# Patient Record
Sex: FEMALE | Race: WHITE | Employment: OTHER | URBAN - METROPOLITAN AREA
[De-identification: names, ages, dates, MRNs, and addresses within clinical notes are randomized per-mention and may not be internally consistent; named-entity substitution may affect disease eponyms.]

---

## 2021-11-08 ENCOUNTER — HOSPITAL ENCOUNTER (INPATIENT)
Age: 71
LOS: 4 days | Discharge: HOME | DRG: 871 | End: 2021-11-12
Attending: STUDENT IN AN ORGANIZED HEALTH CARE EDUCATION/TRAINING PROGRAM | Admitting: INTERNAL MEDICINE
Payer: MEDICARE

## 2021-11-08 ENCOUNTER — APPOINTMENT (OUTPATIENT)
Dept: CT IMAGING | Age: 71
DRG: 871 | End: 2021-11-08
Payer: MEDICARE

## 2021-11-08 DIAGNOSIS — N39.0 URINARY TRACT INFECTION WITHOUT HEMATURIA, SITE UNSPECIFIED: ICD-10-CM

## 2021-11-08 DIAGNOSIS — G93.40 ENCEPHALOPATHY: Primary | ICD-10-CM

## 2021-11-08 DIAGNOSIS — C83.33 DIFFUSE LARGE B-CELL LYMPHOMA OF INTRA-ABDOMINAL LYMPH NODES (HCC): ICD-10-CM

## 2021-11-08 PROBLEM — G93.41 ACUTE METABOLIC ENCEPHALOPATHY: Status: ACTIVE | Noted: 2021-11-08

## 2021-11-08 LAB
A/G RATIO: 1.3 (ref 1.1–2.2)
ALBUMIN SERPL-MCNC: 3.5 G/DL (ref 3.4–5)
ALP BLD-CCNC: 88 U/L (ref 40–129)
ALT SERPL-CCNC: 14 U/L (ref 10–40)
ANION GAP SERPL CALCULATED.3IONS-SCNC: 14 MMOL/L (ref 3–16)
ANISOCYTOSIS: ABNORMAL
AST SERPL-CCNC: 13 U/L (ref 15–37)
BACTERIA: ABNORMAL /HPF
BANDED NEUTROPHILS RELATIVE PERCENT: 4 % (ref 0–7)
BASOPHILS ABSOLUTE: 0 K/UL (ref 0–0.2)
BASOPHILS RELATIVE PERCENT: 0 %
BILIRUB SERPL-MCNC: 0.3 MG/DL (ref 0–1)
BILIRUBIN URINE: NEGATIVE
BLOOD, URINE: ABNORMAL
BUN BLDV-MCNC: 13 MG/DL (ref 7–20)
CALCIUM SERPL-MCNC: 9.1 MG/DL (ref 8.3–10.6)
CHLORIDE BLD-SCNC: 100 MMOL/L (ref 99–110)
CLARITY: ABNORMAL
CO2: 24 MMOL/L (ref 21–32)
COLOR: YELLOW
CREAT SERPL-MCNC: 0.7 MG/DL (ref 0.6–1.2)
EKG ATRIAL RATE: 123 BPM
EKG DIAGNOSIS: NORMAL
EKG P AXIS: 57 DEGREES
EKG P-R INTERVAL: 144 MS
EKG Q-T INTERVAL: 308 MS
EKG QRS DURATION: 58 MS
EKG QTC CALCULATION (BAZETT): 440 MS
EKG R AXIS: 30 DEGREES
EKG T AXIS: 66 DEGREES
EKG VENTRICULAR RATE: 123 BPM
EOSINOPHILS ABSOLUTE: 0 K/UL (ref 0–0.6)
EOSINOPHILS RELATIVE PERCENT: 0 %
GFR AFRICAN AMERICAN: >60
GFR NON-AFRICAN AMERICAN: >60
GLUCOSE BLD-MCNC: 88 MG/DL (ref 70–99)
GLUCOSE BLD-MCNC: 92 MG/DL (ref 70–99)
GLUCOSE URINE: NEGATIVE MG/DL
HCT VFR BLD CALC: 33.4 % (ref 36–48)
HEMOGLOBIN: 10.8 G/DL (ref 12–16)
INFLUENZA A: NOT DETECTED
INFLUENZA B: NOT DETECTED
KETONES, URINE: NEGATIVE MG/DL
LACTIC ACID, SEPSIS: 0.9 MMOL/L (ref 0.4–1.9)
LEUKOCYTE ESTERASE, URINE: ABNORMAL
LYMPHOCYTES ABSOLUTE: 1.6 K/UL (ref 1–5.1)
LYMPHOCYTES RELATIVE PERCENT: 10 %
MCH RBC QN AUTO: 26.9 PG (ref 26–34)
MCHC RBC AUTO-ENTMCNC: 32.4 G/DL (ref 31–36)
MCV RBC AUTO: 83 FL (ref 80–100)
MICROSCOPIC EXAMINATION: YES
MONOCYTES ABSOLUTE: 0.3 K/UL (ref 0–1.3)
MONOCYTES RELATIVE PERCENT: 2 %
MUCUS: ABNORMAL /LPF
NEUTROPHILS ABSOLUTE: 14 K/UL (ref 1.7–7.7)
NEUTROPHILS RELATIVE PERCENT: 84 %
NITRITE, URINE: POSITIVE
PDW BLD-RTO: 15.7 % (ref 12.4–15.4)
PERFORMED ON: NORMAL
PH UA: 6.5 (ref 5–8)
PLATELET # BLD: 499 K/UL (ref 135–450)
PLATELET SLIDE REVIEW: ABNORMAL
PMV BLD AUTO: 7.9 FL (ref 5–10.5)
POTASSIUM REFLEX MAGNESIUM: 4.2 MMOL/L (ref 3.5–5.1)
PROTEIN UA: 30 MG/DL
RBC # BLD: 4.03 M/UL (ref 4–5.2)
RBC UA: ABNORMAL /HPF (ref 0–4)
SARS-COV-2 RNA, RT PCR: NOT DETECTED
SLIDE REVIEW: ABNORMAL
SODIUM BLD-SCNC: 138 MMOL/L (ref 136–145)
SPECIFIC GRAVITY UA: 1.01 (ref 1–1.03)
TOTAL PROTEIN: 6.3 G/DL (ref 6.4–8.2)
TROPONIN: <0.01 NG/ML
TSH REFLEX: 2.32 UIU/ML (ref 0.27–4.2)
URINE REFLEX TO CULTURE: YES
URINE TYPE: ABNORMAL
UROBILINOGEN, URINE: 0.2 E.U./DL
WBC # BLD: 15.9 K/UL (ref 4–11)
WBC UA: >100 /HPF (ref 0–5)

## 2021-11-08 PROCEDURE — 96365 THER/PROPH/DIAG IV INF INIT: CPT

## 2021-11-08 PROCEDURE — 87040 BLOOD CULTURE FOR BACTERIA: CPT

## 2021-11-08 PROCEDURE — 81001 URINALYSIS AUTO W/SCOPE: CPT

## 2021-11-08 PROCEDURE — 70450 CT HEAD/BRAIN W/O DYE: CPT

## 2021-11-08 PROCEDURE — 93005 ELECTROCARDIOGRAM TRACING: CPT | Performed by: STUDENT IN AN ORGANIZED HEALTH CARE EDUCATION/TRAINING PROGRAM

## 2021-11-08 PROCEDURE — 94761 N-INVAS EAR/PLS OXIMETRY MLT: CPT

## 2021-11-08 PROCEDURE — 87088 URINE BACTERIA CULTURE: CPT

## 2021-11-08 PROCEDURE — 93010 ELECTROCARDIOGRAM REPORT: CPT | Performed by: INTERNAL MEDICINE

## 2021-11-08 PROCEDURE — 6360000002 HC RX W HCPCS: Performed by: STUDENT IN AN ORGANIZED HEALTH CARE EDUCATION/TRAINING PROGRAM

## 2021-11-08 PROCEDURE — 87184 SC STD DISK METHOD PER PLATE: CPT

## 2021-11-08 PROCEDURE — 99284 EMERGENCY DEPT VISIT MOD MDM: CPT

## 2021-11-08 PROCEDURE — 84484 ASSAY OF TROPONIN QUANT: CPT

## 2021-11-08 PROCEDURE — 87636 SARSCOV2 & INF A&B AMP PRB: CPT

## 2021-11-08 PROCEDURE — 1200000000 HC SEMI PRIVATE

## 2021-11-08 PROCEDURE — 87150 DNA/RNA AMPLIFIED PROBE: CPT

## 2021-11-08 PROCEDURE — 85025 COMPLETE CBC W/AUTO DIFF WBC: CPT

## 2021-11-08 PROCEDURE — 84443 ASSAY THYROID STIM HORMONE: CPT

## 2021-11-08 PROCEDURE — 83605 ASSAY OF LACTIC ACID: CPT

## 2021-11-08 PROCEDURE — 87186 SC STD MICRODIL/AGAR DIL: CPT

## 2021-11-08 PROCEDURE — 87086 URINE CULTURE/COLONY COUNT: CPT

## 2021-11-08 PROCEDURE — 94640 AIRWAY INHALATION TREATMENT: CPT

## 2021-11-08 PROCEDURE — 72125 CT NECK SPINE W/O DYE: CPT

## 2021-11-08 PROCEDURE — 2580000003 HC RX 258: Performed by: STUDENT IN AN ORGANIZED HEALTH CARE EDUCATION/TRAINING PROGRAM

## 2021-11-08 PROCEDURE — 80053 COMPREHEN METABOLIC PANEL: CPT

## 2021-11-08 PROCEDURE — 96361 HYDRATE IV INFUSION ADD-ON: CPT

## 2021-11-08 PROCEDURE — 87077 CULTURE AEROBIC IDENTIFY: CPT

## 2021-11-08 PROCEDURE — 6370000000 HC RX 637 (ALT 250 FOR IP): Performed by: INTERNAL MEDICINE

## 2021-11-08 RX ORDER — METOPROLOL SUCCINATE 100 MG/1
100 TABLET, EXTENDED RELEASE ORAL DAILY
COMMUNITY

## 2021-11-08 RX ORDER — PANTOPRAZOLE SODIUM 40 MG/1
40 TABLET, DELAYED RELEASE ORAL DAILY
Status: DISCONTINUED | OUTPATIENT
Start: 2021-11-09 | End: 2021-11-12 | Stop reason: HOSPADM

## 2021-11-08 RX ORDER — SODIUM CHLORIDE, SODIUM LACTATE, POTASSIUM CHLORIDE, CALCIUM CHLORIDE 600; 310; 30; 20 MG/100ML; MG/100ML; MG/100ML; MG/100ML
INJECTION, SOLUTION INTRAVENOUS CONTINUOUS
Status: DISCONTINUED | OUTPATIENT
Start: 2021-11-08 | End: 2021-11-08

## 2021-11-08 RX ORDER — ONDANSETRON 4 MG/1
4 TABLET, FILM COATED ORAL EVERY 8 HOURS PRN
COMMUNITY

## 2021-11-08 RX ORDER — M-VIT,TX,IRON,MINS/CALC/FOLIC 27MG-0.4MG
1 TABLET ORAL DAILY
COMMUNITY

## 2021-11-08 RX ORDER — ALBUTEROL SULFATE 90 UG/1
2 AEROSOL, METERED RESPIRATORY (INHALATION) EVERY 6 HOURS PRN
Status: DISCONTINUED | OUTPATIENT
Start: 2021-11-08 | End: 2021-11-12 | Stop reason: HOSPADM

## 2021-11-08 RX ORDER — PRAVASTATIN SODIUM 40 MG
40 TABLET ORAL DAILY
COMMUNITY

## 2021-11-08 RX ORDER — PANTOPRAZOLE SODIUM 40 MG/1
40 TABLET, DELAYED RELEASE ORAL DAILY
COMMUNITY

## 2021-11-08 RX ORDER — ALBUTEROL SULFATE 90 UG/1
2 AEROSOL, METERED RESPIRATORY (INHALATION) EVERY 6 HOURS PRN
COMMUNITY

## 2021-11-08 RX ORDER — BUDESONIDE AND FORMOTEROL FUMARATE DIHYDRATE 160; 4.5 UG/1; UG/1
2 AEROSOL RESPIRATORY (INHALATION) 2 TIMES DAILY
COMMUNITY

## 2021-11-08 RX ORDER — SODIUM CHLORIDE 0.9 % (FLUSH) 0.9 %
5-40 SYRINGE (ML) INJECTION PRN
Status: DISCONTINUED | OUTPATIENT
Start: 2021-11-08 | End: 2021-11-12 | Stop reason: HOSPADM

## 2021-11-08 RX ORDER — 0.9 % SODIUM CHLORIDE 0.9 %
1000 INTRAVENOUS SOLUTION INTRAVENOUS ONCE
Status: DISCONTINUED | OUTPATIENT
Start: 2021-11-08 | End: 2021-11-10

## 2021-11-08 RX ORDER — DOCUSATE SODIUM 100 MG/1
100 CAPSULE, LIQUID FILLED ORAL DAILY
Status: DISCONTINUED | OUTPATIENT
Start: 2021-11-09 | End: 2021-11-12 | Stop reason: HOSPADM

## 2021-11-08 RX ORDER — 0.9 % SODIUM CHLORIDE 0.9 %
1000 INTRAVENOUS SOLUTION INTRAVENOUS ONCE
Status: COMPLETED | OUTPATIENT
Start: 2021-11-08 | End: 2021-11-08

## 2021-11-08 RX ORDER — AMLODIPINE BESYLATE 5 MG/1
5 TABLET ORAL DAILY
COMMUNITY

## 2021-11-08 RX ORDER — ACETAMINOPHEN 650 MG/1
650 SUPPOSITORY RECTAL EVERY 6 HOURS PRN
Status: DISCONTINUED | OUTPATIENT
Start: 2021-11-08 | End: 2021-11-12 | Stop reason: HOSPADM

## 2021-11-08 RX ORDER — PRAVASTATIN SODIUM 40 MG
40 TABLET ORAL DAILY
Status: DISCONTINUED | OUTPATIENT
Start: 2021-11-09 | End: 2021-11-12 | Stop reason: HOSPADM

## 2021-11-08 RX ORDER — TRAZODONE HYDROCHLORIDE 100 MG/1
100 TABLET ORAL NIGHTLY
Status: ON HOLD | COMMUNITY
End: 2021-11-12 | Stop reason: HOSPADM

## 2021-11-08 RX ORDER — M-VIT,TX,IRON,MINS/CALC/FOLIC 27MG-0.4MG
1 TABLET ORAL DAILY
Status: DISCONTINUED | OUTPATIENT
Start: 2021-11-09 | End: 2021-11-12 | Stop reason: HOSPADM

## 2021-11-08 RX ORDER — ASPIRIN 81 MG/1
81 TABLET ORAL DAILY
Status: DISCONTINUED | OUTPATIENT
Start: 2021-11-09 | End: 2021-11-12 | Stop reason: HOSPADM

## 2021-11-08 RX ORDER — PROCHLORPERAZINE EDISYLATE 5 MG/ML
10 INJECTION INTRAMUSCULAR; INTRAVENOUS EVERY 6 HOURS PRN
Status: DISCONTINUED | OUTPATIENT
Start: 2021-11-08 | End: 2021-11-12 | Stop reason: HOSPADM

## 2021-11-08 RX ORDER — SODIUM CHLORIDE 0.9 % (FLUSH) 0.9 %
5-40 SYRINGE (ML) INJECTION EVERY 12 HOURS SCHEDULED
Status: DISCONTINUED | OUTPATIENT
Start: 2021-11-08 | End: 2021-11-12 | Stop reason: HOSPADM

## 2021-11-08 RX ORDER — SODIUM CHLORIDE 9 MG/ML
INJECTION, SOLUTION INTRAVENOUS CONTINUOUS
Status: DISCONTINUED | OUTPATIENT
Start: 2021-11-08 | End: 2021-11-12 | Stop reason: HOSPADM

## 2021-11-08 RX ORDER — AMLODIPINE BESYLATE 5 MG/1
5 TABLET ORAL DAILY
Status: DISCONTINUED | OUTPATIENT
Start: 2021-11-09 | End: 2021-11-12 | Stop reason: HOSPADM

## 2021-11-08 RX ORDER — ACETAMINOPHEN 325 MG/1
650 TABLET ORAL EVERY 6 HOURS PRN
Status: DISCONTINUED | OUTPATIENT
Start: 2021-11-08 | End: 2021-11-12 | Stop reason: HOSPADM

## 2021-11-08 RX ORDER — BUDESONIDE AND FORMOTEROL FUMARATE DIHYDRATE 160; 4.5 UG/1; UG/1
2 AEROSOL RESPIRATORY (INHALATION) 2 TIMES DAILY
Status: DISCONTINUED | OUTPATIENT
Start: 2021-11-08 | End: 2021-11-12 | Stop reason: HOSPADM

## 2021-11-08 RX ORDER — SODIUM PHOSPHATE,MONO-DIBASIC 19G-7G/118
1 ENEMA (ML) RECTAL DAILY
COMMUNITY

## 2021-11-08 RX ORDER — ALLOPURINOL 300 MG/1
300 TABLET ORAL DAILY
COMMUNITY

## 2021-11-08 RX ORDER — ONDANSETRON 2 MG/ML
4 INJECTION INTRAMUSCULAR; INTRAVENOUS EVERY 6 HOURS PRN
Status: DISCONTINUED | OUTPATIENT
Start: 2021-11-08 | End: 2021-11-12 | Stop reason: HOSPADM

## 2021-11-08 RX ORDER — METOPROLOL SUCCINATE 50 MG/1
100 TABLET, EXTENDED RELEASE ORAL DAILY
Status: DISCONTINUED | OUTPATIENT
Start: 2021-11-09 | End: 2021-11-12 | Stop reason: HOSPADM

## 2021-11-08 RX ORDER — SODIUM CHLORIDE 9 MG/ML
25 INJECTION, SOLUTION INTRAVENOUS PRN
Status: DISCONTINUED | OUTPATIENT
Start: 2021-11-08 | End: 2021-11-12 | Stop reason: HOSPADM

## 2021-11-08 RX ADMIN — Medication 2 PUFF: at 23:36

## 2021-11-08 RX ADMIN — CEFTRIAXONE SODIUM 1000 MG: 1 INJECTION, POWDER, FOR SOLUTION INTRAMUSCULAR; INTRAVENOUS at 17:06

## 2021-11-08 RX ADMIN — SODIUM CHLORIDE 1000 ML: 9 INJECTION, SOLUTION INTRAVENOUS at 14:45

## 2021-11-08 RX ADMIN — SODIUM CHLORIDE, POTASSIUM CHLORIDE, SODIUM LACTATE AND CALCIUM CHLORIDE: 600; 310; 30; 20 INJECTION, SOLUTION INTRAVENOUS at 18:59

## 2021-11-08 NOTE — ED PROVIDER NOTES
[address] ED      CHIEF COMPLAINT  Altered Mental Status (PAtient arrives via EMS , per family patient was not responding appropriately. Patient had a fall this AM, denies any pain. PAtient will not verbalize responses during triage, shakes head yes and no. Dr Sb Aguilar at bedside for possible CVA symptoms)       HISTORY OF PRESENT ILLNESS  Chau Lomeli is a 66 y.o. female  who presents to the ED complaining of confusion and decreased p.o. intake, per her sister who is at the bedside. She states the patient has been having waxing and waning mental status for the last month, with decreased oral intake. She does have large B-cell lymphoma and has been undergoing chemotherapy. They deny any recent fevers, chills, chest pain, shortness of breath, abdominal pain. During my evaluation the patient is able to verbalize responses to me, however she does not consistently follow commands. She is currently denying any pain. No other complaints, modifying factors or associated symptoms. I have reviewed the following from the nursing documentation. Past Medical History:   Diagnosis Date    Basal cell carcinoma      Past Surgical History:   Procedure Laterality Date    HERNIA REPAIR      HYSTERECTOMY       No family history on file.   Social History     Socioeconomic History    Marital status:      Spouse name: Not on file    Number of children: Not on file    Years of education: Not on file    Highest education level: Not on file   Occupational History    Not on file   Tobacco Use    Smoking status: Never Smoker    Smokeless tobacco: Not on file   Substance and Sexual Activity    Alcohol use: No    Drug use: No    Sexual activity: Not Currently   Other Topics Concern    Not on file   Social History Narrative    Not on file     Social Determinants of Health     Financial Resource Strain:     Difficulty of Paying Living Expenses: Not on file   Food Insecurity:     Worried About 500/VITAMIN D PO) Take 1 tablet by mouth daily C-500mg/D-200mg tablets      glucosamine-chondroitin 500-400 MG CAPS Take 1 capsule by mouth daily      Multiple Vitamins-Minerals (THERAPEUTIC MULTIVITAMIN-MINERALS) tablet Take 1 tablet by mouth daily      pravastatin (PRAVACHOL) 40 MG tablet Take 40 mg by mouth daily      Mometasone Furoate 50 MCG/ACT AERO Inhale 2 sprays into the lungs daily      albuterol sulfate HFA (VENTOLIN HFA) 108 (90 Base) MCG/ACT inhaler Inhale 2 puffs into the lungs every 6 hours as needed for Wheezing      traZODone (DESYREL) 100 MG tablet Take 100 mg by mouth nightly      aspirin 81 MG tablet Take 81 mg by mouth daily      docusate sodium (COLACE) 100 MG capsule Take 100 mg by mouth Daily       Allergies   Allergen Reactions    Codeine     Penicillins     Psyllium        REVIEW OF SYSTEMS  10 systems reviewed, pertinent positives per HPI otherwise noted to be negative. PHYSICAL EXAM  BP (!) 170/87   Pulse 113   Temp 98.1 °F (36.7 °C) (Oral)   Resp 20   SpO2 97%    GENERAL APPEARANCE: Awake and alert. Cooperative. no distress. HENT: Normocephalic. Atraumatic. Mucous membranes are moist  NECK: Supple. EYES: PERRL. EOM's grossly intact. HEART/CHEST: RRR. No murmurs. LUNGS: Respirations unlabored. CTAB. Good air exchange. Speaking comfortably in full sentences. ABDOMEN: No tenderness. Soft. Non-distended. No masses. No organomegaly. No guarding or rebound. MUSCULOSKELETAL: No extremity edema. Compartments soft. No deformity. No tenderness in the extremities. All extremities neurovascularly intact. SKIN: Warm and dry. No acute rashes. NEUROLOGICAL: Alert and confused. CN's 2-12 intact. No gross facial drooping. Strength 5/5, sensation intact. PSYCHIATRIC: Normal mood and affect. LABS  I have reviewed all labs for this visit.    Results for orders placed or performed during the hospital encounter of 11/08/21   CBC Auto Differential   Result Value Ref Range WBC 15.9 (H) 4.0 - 11.0 K/uL    RBC 4.03 4.00 - 5.20 M/uL    Hemoglobin 10.8 (L) 12.0 - 16.0 g/dL    Hematocrit 33.4 (L) 36.0 - 48.0 %    MCV 83.0 80.0 - 100.0 fL    MCH 26.9 26.0 - 34.0 pg    MCHC 32.4 31.0 - 36.0 g/dL    RDW 15.7 (H) 12.4 - 15.4 %    Platelets 446 (H) 725 - 450 K/uL    MPV 7.9 5.0 - 10.5 fL    PLATELET SLIDE REVIEW Increased     SLIDE REVIEW see below     Neutrophils % 84.0 %    Lymphocytes % 10.0 %    Monocytes % 2.0 %    Eosinophils % 0.0 %    Basophils % 0.0 %    Neutrophils Absolute 14.0 (H) 1.7 - 7.7 K/uL    Lymphocytes Absolute 1.6 1.0 - 5.1 K/uL    Monocytes Absolute 0.3 0.0 - 1.3 K/uL    Eosinophils Absolute 0.0 0.0 - 0.6 K/uL    Basophils Absolute 0.0 0.0 - 0.2 K/uL    Bands Relative 4 0 - 7 %    Anisocytosis 1+ (A)    Comprehensive Metabolic Panel w/ Reflex to MG   Result Value Ref Range    Sodium 138 136 - 145 mmol/L    Potassium reflex Magnesium 4.2 3.5 - 5.1 mmol/L    Chloride 100 99 - 110 mmol/L    CO2 24 21 - 32 mmol/L    Anion Gap 14 3 - 16    Glucose 88 70 - 99 mg/dL    BUN 13 7 - 20 mg/dL    CREATININE 0.7 0.6 - 1.2 mg/dL    GFR Non-African American >60 >60    GFR African American >60 >60    Calcium 9.1 8.3 - 10.6 mg/dL    Total Protein 6.3 (L) 6.4 - 8.2 g/dL    Albumin 3.5 3.4 - 5.0 g/dL    Albumin/Globulin Ratio 1.3 1.1 - 2.2    Total Bilirubin 0.3 0.0 - 1.0 mg/dL    Alkaline Phosphatase 88 40 - 129 U/L    ALT 14 10 - 40 U/L    AST 13 (L) 15 - 37 U/L   Troponin   Result Value Ref Range    Troponin <0.01 <0.01 ng/mL   Lactate, Sepsis   Result Value Ref Range    Lactic Acid, Sepsis 0.9 0.4 - 1.9 mmol/L   TSH with Reflex   Result Value Ref Range    TSH 2.32 0.27 - 4.20 uIU/mL   Urinalysis Reflex to Culture    Specimen: Urine, clean catch   Result Value Ref Range    Color, UA Yellow Straw/Yellow    Clarity, UA SL CLOUDY (A) Clear    Glucose, Ur Negative Negative mg/dL    Bilirubin Urine Negative Negative    Ketones, Urine Negative Negative mg/dL    Specific Gravity, UA 1.015 1.005 - 1.030    Blood, Urine SMALL (A) Negative    pH, UA 6.5 5.0 - 8.0    Protein, UA 30 (A) Negative mg/dL    Urobilinogen, Urine 0.2 <2.0 E.U./dL    Nitrite, Urine POSITIVE (A) Negative    Leukocyte Esterase, Urine LARGE (A) Negative    Microscopic Examination YES     Urine Type NotGiven     Urine Reflex to Culture Yes    Microscopic Urinalysis   Result Value Ref Range    Mucus, UA 2+ (A) None Seen /LPF    WBC, UA >100 (A) 0 - 5 /HPF    RBC, UA see below (A) 0 - 4 /HPF    Bacteria, UA 3+ (A) None Seen /HPF   POCT Glucose   Result Value Ref Range    POC Glucose 92 70 - 99 mg/dl    Performed on ACCU-CHEK    EKG 12 Lead   Result Value Ref Range    Ventricular Rate 123 BPM    Atrial Rate 123 BPM    P-R Interval 144 ms    QRS Duration 58 ms    Q-T Interval 308 ms    QTc Calculation (Bazett) 440 ms    P Axis 57 degrees    R Axis 30 degrees    T Axis 66 degrees    Diagnosis       Sinus tachycardiaOtherwise normal ECGConfirmed by MELISSA CALERO, RENE ([id number]) on 11/8/2021 5:30:38 PM       ECG  The Ekg interpreted by me shows  Sinus tachycardia with a rate of 123 bpm.  Normal axis. No acute injury pattern. , QRS 58, QTc 440. RADIOLOGY  CT Cervical Spine WO Contrast   Final Result   No acute abnormality of the cervical spine. CT Head WO Contrast   Preliminary Result   No evidence of acute intracranial abnormality on a study limited by motion   artifact as described above. ED COURSE/MDM  Patient seen and evaluated. Old records reviewed. Labs and imaging reviewed and results discussed with patient. Imaging is unremarkable and labs are suggestive of UTI. I suspect the patient has metabolic encephalopathy secondary to her UTI. She is treated with ceftriaxone and IV fluid. At the patient's sister request, I am attempting to contact her oncologist to discuss the need for transfer for further evaluation.   I did discuss with Dr. Torrie Castleman, who agreed the patient would be appropriate for hospitalization here with continued treatment of her UTI. Discussed with hospitalist who agreed to accept the patient to his service. Admitted in stable condition. During the patient's ED course, the patient was given:  Medications   0.9 % sodium chloride bolus (0 mLs IntraVENous Stopped 11/8/21 1635)   cefTRIAXone (ROCEPHIN) 1000 mg IVPB in 50 mL D5W minibag (1,000 mg IntraVENous New Bag 11/8/21 1706)        CLINICAL IMPRESSION  1. Encephalopathy    2. Urinary tract infection without hematuria, site unspecified        Blood pressure (!) 170/87, pulse 113, temperature 98.1 °F (36.7 °C), temperature source Oral, resp. rate 20, SpO2 97 %. DISPOSITION  Cherelle Larsen was admitted in stable condition. Patient was given scripts for the following medications. I counseled patient how to take these medications. New Prescriptions    No medications on file       Follow-up with:  No follow-up provider specified. DISCLAIMER: This chart was created using Dragon dictation software. Efforts were made by me to ensure accuracy, however some errors may be present due to limitations of this technology and occasionally words are not transcribed correctly.        Linda Sinha DO  11/08/21 2021

## 2021-11-08 NOTE — ED NOTES
Call placed to [address] [place] [address] to consult Dr. Castillo Or- Hematology/oncology at [facility], RN  11/08/21 8715

## 2021-11-08 NOTE — ED NOTES
Bed: 19  Expected date:   Expected time:   Means of arrival:   Comments:  [address] Jonatan Amanda RN  11/08/21 1313

## 2021-11-09 LAB
A/G RATIO: 1 (ref 1.1–2.2)
ALBUMIN SERPL-MCNC: 2.6 G/DL (ref 3.4–5)
ALP BLD-CCNC: 76 U/L (ref 40–129)
ALT SERPL-CCNC: 13 U/L (ref 10–40)
ANION GAP SERPL CALCULATED.3IONS-SCNC: 15 MMOL/L (ref 3–16)
ANISOCYTOSIS: ABNORMAL
AST SERPL-CCNC: 14 U/L (ref 15–37)
ATYPICAL LYMPHOCYTE RELATIVE PERCENT: 1 % (ref 0–6)
BANDED NEUTROPHILS RELATIVE PERCENT: 9 % (ref 0–7)
BASOPHILS ABSOLUTE: 0 K/UL (ref 0–0.2)
BASOPHILS RELATIVE PERCENT: 0 %
BILIRUB SERPL-MCNC: <0.2 MG/DL (ref 0–1)
BUN BLDV-MCNC: 9 MG/DL (ref 7–20)
CALCIUM SERPL-MCNC: 8.1 MG/DL (ref 8.3–10.6)
CHLORIDE BLD-SCNC: 104 MMOL/L (ref 99–110)
CO2: 18 MMOL/L (ref 21–32)
CREAT SERPL-MCNC: <0.5 MG/DL (ref 0.6–1.2)
EOSINOPHILS ABSOLUTE: 0 K/UL (ref 0–0.6)
EOSINOPHILS RELATIVE PERCENT: 0 %
GFR AFRICAN AMERICAN: >60
GFR NON-AFRICAN AMERICAN: >60
GLUCOSE BLD-MCNC: 83 MG/DL (ref 70–99)
HCT VFR BLD CALC: 30.3 % (ref 36–48)
HEMOGLOBIN: 9.7 G/DL (ref 12–16)
LYMPHOCYTES ABSOLUTE: 0.3 K/UL (ref 1–5.1)
LYMPHOCYTES RELATIVE PERCENT: 1 %
MCH RBC QN AUTO: 26.9 PG (ref 26–34)
MCHC RBC AUTO-ENTMCNC: 32 G/DL (ref 31–36)
MCV RBC AUTO: 84 FL (ref 80–100)
METAMYELOCYTES RELATIVE PERCENT: 1 %
MONOCYTES ABSOLUTE: 0.6 K/UL (ref 0–1.3)
MONOCYTES RELATIVE PERCENT: 4 %
MYELOCYTE PERCENT: 1 %
NEUTROPHILS ABSOLUTE: 13 K/UL (ref 1.7–7.7)
NEUTROPHILS RELATIVE PERCENT: 83 %
PDW BLD-RTO: 15.9 % (ref 12.4–15.4)
PLATELET # BLD: 405 K/UL (ref 135–450)
PLATELET SLIDE REVIEW: ABNORMAL
PMV BLD AUTO: 7.6 FL (ref 5–10.5)
POIKILOCYTES: ABNORMAL
POTASSIUM REFLEX MAGNESIUM: 3.7 MMOL/L (ref 3.5–5.1)
RBC # BLD: 3.61 M/UL (ref 4–5.2)
REPORT: NORMAL
SLIDE REVIEW: ABNORMAL
SODIUM BLD-SCNC: 137 MMOL/L (ref 136–145)
TOTAL PROTEIN: 5.3 G/DL (ref 6.4–8.2)
TOXIC GRANULATION: PRESENT
WBC # BLD: 13.8 K/UL (ref 4–11)

## 2021-11-09 PROCEDURE — 99232 SBSQ HOSP IP/OBS MODERATE 35: CPT | Performed by: NURSE PRACTITIONER

## 2021-11-09 PROCEDURE — 2580000003 HC RX 258: Performed by: INTERNAL MEDICINE

## 2021-11-09 PROCEDURE — 1200000000 HC SEMI PRIVATE

## 2021-11-09 PROCEDURE — 36415 COLL VENOUS BLD VENIPUNCTURE: CPT

## 2021-11-09 PROCEDURE — 6370000000 HC RX 637 (ALT 250 FOR IP): Performed by: INTERNAL MEDICINE

## 2021-11-09 PROCEDURE — 94640 AIRWAY INHALATION TREATMENT: CPT

## 2021-11-09 PROCEDURE — 97166 OT EVAL MOD COMPLEX 45 MIN: CPT

## 2021-11-09 PROCEDURE — 94761 N-INVAS EAR/PLS OXIMETRY MLT: CPT

## 2021-11-09 PROCEDURE — 97535 SELF CARE MNGMENT TRAINING: CPT

## 2021-11-09 PROCEDURE — 6360000002 HC RX W HCPCS: Performed by: INTERNAL MEDICINE

## 2021-11-09 PROCEDURE — 85025 COMPLETE CBC W/AUTO DIFF WBC: CPT

## 2021-11-09 PROCEDURE — 80053 COMPREHEN METABOLIC PANEL: CPT

## 2021-11-09 PROCEDURE — 97530 THERAPEUTIC ACTIVITIES: CPT

## 2021-11-09 RX ADMIN — PRAVASTATIN SODIUM 40 MG: 40 TABLET ORAL at 08:35

## 2021-11-09 RX ADMIN — ASPIRIN 81 MG: 81 TABLET, COATED ORAL at 08:35

## 2021-11-09 RX ADMIN — PANTOPRAZOLE SODIUM 40 MG: 40 TABLET, DELAYED RELEASE ORAL at 08:36

## 2021-11-09 RX ADMIN — DOCUSATE SODIUM 100 MG: 100 CAPSULE ORAL at 08:37

## 2021-11-09 RX ADMIN — Medication 2 PUFF: at 08:24

## 2021-11-09 RX ADMIN — Medication 1 TABLET: at 08:36

## 2021-11-09 RX ADMIN — SODIUM CHLORIDE: 9 INJECTION, SOLUTION INTRAVENOUS at 05:44

## 2021-11-09 RX ADMIN — AMLODIPINE BESYLATE 5 MG: 5 TABLET ORAL at 08:35

## 2021-11-09 RX ADMIN — METOPROLOL SUCCINATE 100 MG: 50 TABLET, EXTENDED RELEASE ORAL at 08:35

## 2021-11-09 RX ADMIN — ENOXAPARIN SODIUM 40 MG: 100 INJECTION SUBCUTANEOUS at 08:36

## 2021-11-09 RX ADMIN — Medication 2 PUFF: at 20:02

## 2021-11-09 RX ADMIN — SODIUM CHLORIDE: 9 INJECTION, SOLUTION INTRAVENOUS at 13:32

## 2021-11-09 RX ADMIN — CEFTRIAXONE SODIUM 1000 MG: 1 INJECTION, POWDER, FOR SOLUTION INTRAMUSCULAR; INTRAVENOUS at 16:49

## 2021-11-09 RX ADMIN — Medication 10 ML: at 05:43

## 2021-11-09 NOTE — PROGRESS NOTES
Admission assessment complete. See doc flow. Sister Robyn Riddle [facility]) at the bedside to answer admission questions. Patient with new confusion. Admitted for UTI and antibiotics. Patient currently with large B-cell lymphoma and undergoing chemotherapy at [facility]. Right chest port, accessed in the ER. Unit lab draw. Patient lives in an independent living apartment with a niece Radha Dumas) it was reported that the patient had a fall at home on Monday morning when she was ambulating from her bed to her bedside commode. Sister Robyn Riddle reports new onset confusion. Tele-sitter placed in room. Pur-wick in place for urine output. Sister Robyn Riddle is to bring in paperwork for POA, DNR, Covid Vx, and home med list to verify. Home med list pending. Robyn Yaredcodymary reports patient has had poor PO intake, decreased appetite. Patient will be a feed for meals to encourage eating and drinking. Call light and bedside table within easy reach.

## 2021-11-09 NOTE — FLOWSHEET NOTE
11/09/21 0815   Vital Signs   Temp 98.4 °F (36.9 °C)   Temp Source Oral   Pulse 112   Heart Rate Source Monitor   Resp 18   /86   BP Location Left upper arm   Patient Position Semi fowlers   Level of Consciousness Alert (0)   MEWS Score 3   Patient Currently in Pain Denies   Oxygen Therapy   SpO2 96 %   O2 Device None (Room air)     Patient alert to self only. Sitting up in bed eating breakfast with assistance. VSS. Incontinent of yellow clear colored urine, inocencio care given. Afrebrile. Denies any complains of pain or discomfort. Fluids encouraged. Bed in low position. Call bell within reach. Tele sitter in room. Bed alarm on. Will continue to monitor.

## 2021-11-09 NOTE — H&P
Hospital Medicine History & Physical      PCP: Yareli Randall MD    Date of Service: Pt seen/examined on 11/8/21 and admitted on 11/8/21 to Inpatient    Chief Complaint   Patient presents with    Altered Mental Status     PAtient arrives via EMS , per family patient was not responding appropriately. Patient had a fall this AM, denies any pain. PAtient will not verbalize responses during triage, shakes head yes and no. Dr Cecelia Santillan at bedside for possible CVA symptoms       History Of Present Illness: The patient is a 66 y.o. female with PMH below, presents with MS change/conmfusion, decreased PO intake, fall, generalized weakness. Pt has been having waxing and waning MS over the last month. This has been worsening over that time per her sister. She has had associated generalized weakness and decreased PO intake. Of note, she reportedly fell this am.  She denies any pain or injury. Verbal responses from pt are limited but she is able to speak clearly. She does not follow commands very well at this time. Lastly, she has hx of B cell lymphoma. Past Medical History:        Diagnosis Date    Basal cell carcinoma        Past Surgical History:        Procedure Laterality Date    HERNIA REPAIR      HYSTERECTOMY         Medications Prior to Admission:    Prior to Admission medications    Medication Sig Start Date End Date Taking?  Authorizing Provider   ondansetron (ZOFRAN) 4 MG tablet Take 4 mg by mouth every 8 hours as needed for Nausea or Vomiting   Yes Historical Provider, MD   budesonide-formoterol (SYMBICORT) 160-4.5 MCG/ACT AERO Inhale 2 puffs into the lungs 2 times daily   Yes Historical Provider, MD   metoprolol succinate (TOPROL XL) 100 MG extended release tablet Take 100 mg by mouth daily    Historical Provider, MD   amLODIPine (NORVASC) 5 MG tablet Take 5 mg by mouth daily    Historical Provider, MD   allopurinol (ZYLOPRIM) 300 MG tablet Take 300 mg by mouth daily For 30 days, unknown start date.    Historical Provider, MD   pantoprazole (PROTONIX) 40 MG tablet Take 40 mg by mouth daily For 30 days, unknown start date. Historical Provider, MD   Coenzyme Q10 (Q-10 CO-ENZYME PO) Take 400 mg by mouth daily 50mg tablets    Historical Provider, MD   Calcium Carb-Cholecalciferol (CALCIUM 500/VITAMIN D PO) Take 1 tablet by mouth daily C-500mg/D-200mg tablets    Historical Provider, MD   glucosamine-chondroitin 500-400 MG CAPS Take 1 capsule by mouth daily    Historical Provider, MD   Multiple Vitamins-Minerals (THERAPEUTIC MULTIVITAMIN-MINERALS) tablet Take 1 tablet by mouth daily    Historical Provider, MD   pravastatin (PRAVACHOL) 40 MG tablet Take 40 mg by mouth daily    Historical Provider, MD   Mometasone Furoate 50 MCG/ACT AERO Inhale 2 sprays into the lungs daily    Historical Provider, MD   albuterol sulfate HFA (VENTOLIN HFA) 108 (90 Base) MCG/ACT inhaler Inhale 2 puffs into the lungs every 6 hours as needed for Wheezing    Historical Provider, MD   traZODone (DESYREL) 100 MG tablet Take 100 mg by mouth nightly    Historical Provider, MD   aspirin 81 MG tablet Take 81 mg by mouth daily    Historical Provider, MD   docusate sodium (COLACE) 100 MG capsule Take 100 mg by mouth Daily    Historical Provider, MD       Allergies:  Codeine, Penicillins, and Psyllium    Social History:    TOBACCO:   reports that she has never smoked. She does not have any smokeless tobacco history on file. ETOH:   reports no history of alcohol use. Family History:  Reviewed in detail and negative for DM, Early CAD, Cancer (except as below). Positive as follows:    No family history on file.     REVIEW OF SYSTEMS:   Pertinent positives/negatives as follows: MS change/conmfusion, decreased PO intake, fall, generalized weakness, and as discussed in HPI, otherwise a complete ROS performed and all other systems are negative  PHYSICAL EXAM PERFORMED:    BP (!) 140/79   Pulse 119   Temp 98.1 °F (36.7 °C) (Oral)   Resp 18 SpO2 99%     GEN:  Awake and alert, Ox1. NAD. HEENT:  NC/AT,EOMI, MMM, no erythema/exudates or visible masses. CVS:  Normal S1,S2. RRR. Without M/G/R.   LUNG:   CTA-B. no wheezes, rales or rhonchi  ABD:  Soft, ND/NT, BS+ x4. Without G/R.  EXT: 2+ pulses, no c/c/e. Brisk cap refill. PSY:  Thought process limited, affect confused. MEERA:  Does not follow commands very well. Grossly: CN III-XII intact, moves all 4 spontaneously, sensory grossly intact. SKIN: No rash or lesions on visible skin. Chart review shows recent radiographs:  CT Head WO Contrast    Result Date: 11/8/2021  EXAMINATION: CT OF THE HEAD WITHOUT CONTRAST  11/8/2021 2:42 pm TECHNIQUE: CT of the head was performed without the administration of intravenous contrast. Dose modulation, iterative reconstruction, and/or weight based adjustment of the mA/kV was utilized to reduce the radiation dose to as low as reasonably achievable. COMPARISON: None. HISTORY: ORDERING SYSTEM PROVIDED HISTORY: ams TECHNOLOGIST PROVIDED HISTORY: Reason for exam:->ams Has a \"code stroke\" or \"stroke alert\" been called? ->No Decision Support Exception - unselect if not a suspected or confirmed emergency medical condition->Emergency Medical Condition (MA) Reason for Exam: AMS Acuity: Acute Type of Exam: Initial FINDINGS: BRAIN/VENTRICLES: Moderate motion artifact is present on the examination. The ventricular system is within normal limits for patient age. No evidence of mass effect or of midline shift. Prominence of sulci overlying convexities of cerebral hemispheres consistent with atrophy. Foci of abnormal low-attenuation identified in periventricular/subcortical white matter and centrosylvian regions. Finding is nonspecific; however, likely on the basis of changes of ischemic leukoencephalopathy. Faint basal ganglia calcification is identified. No abnormal extra-axial fluid collection is identified.   There is atherosclerotic calcification of distal internal carotid arteries. ORBITS: The visualized portion of the orbits demonstrate no acute abnormality. SINUSES: The visualized paranasal sinuses and mastoid air cells demonstrate no acute abnormality. SOFT TISSUES/SKULL:  No acute abnormality of the visualized skull or soft tissues. No evidence of acute intracranial abnormality on a study limited by motion artifact as described above. CT Cervical Spine WO Contrast    Result Date: 11/8/2021  EXAMINATION: CT OF THE CERVICAL SPINE WITHOUT CONTRAST 11/8/2021 2:43 pm TECHNIQUE: CT of the cervical spine was performed without the administration of intravenous contrast. Multiplanar reformatted images are provided for review. Dose modulation, iterative reconstruction, and/or weight based adjustment of the mA/kV was utilized to reduce the radiation dose to as low as reasonably achievable. COMPARISON: None. HISTORY: ORDERING SYSTEM PROVIDED HISTORY: fall, ams TECHNOLOGIST PROVIDED HISTORY: Reason for exam:->fall, ams Decision Support Exception - unselect if not a suspected or confirmed emergency medical condition->Emergency Medical Condition (MA) Reason for Exam: AMS Acuity: Acute Type of Exam: Initial FINDINGS: BONES/ALIGNMENT: There is no acute fracture or traumatic malalignment. DEGENERATIVE CHANGES: Multilevel degenerative changes. SOFT TISSUES: There is no prevertebral soft tissue swelling. No acute abnormality of the cervical spine.        EKG:    EKG 12 Lead [[id number]]    Collected: 11/08/21 1447    Updated: 11/08/21 1730     Ventricular Rate 123 BPM    Atrial Rate 123 BPM    P-R Interval 144 ms    QRS Duration 58 ms    Q-T Interval 308 ms    QTc Calculation (Bazett) 440 ms    P Axis 57 degrees    R Axis 30 degrees    T Axis 66 degrees    Diagnosis Sinus tachycardiaOtherwise normal ECGConfirmed by RENE TORRES MD ([id number]) on 11/8/2021 5:30:38 PM         CBC:  Recent Labs     11/08/21  1418   WBC 15.9*   HGB 10.8*   HCT 33.4*   *        RENAL  Recent Labs 11/08/21  1418      K 4.2      CO2 24   BUN 13   CREATININE 0.7   GLUCOSE 88       LFT'S:  Recent Labs     11/08/21  1418   AST 13*   ALT 14   BILITOT 0.3   ALKPHOS 88       CARDIAC ENZYMES:   Recent Labs     11/08/21  1418   TROPONINI <0.01     LACTIC ACID:  Recent Labs     11/08/21  1418   LACTSEPSIS 0.9     U/A:  Recent Labs     11/08/21  1625   LEUKOCYTESUR LARGE*   BACTERIA 3+*   WBCUA >100*   COLORU Yellow   RBCUA see below*   MUCUS 2+*   CLARITYU SL CLOUDY*   SPECGRAV 1.015   BLOODU SMALL*   GLUCOSEU Negative     PHYSICIAN CERTIFICATION  I certify that Maral Palmer is expected to be hospitalized for 2 midnights based on the following assessment and plan:    ASSESSMENT/PLAN:  1. Sepsis (WBC, HR, RR; lactic nml), likely 2/2 UTI. IV ceftriaxone, IVF, f/u cx. No need for pressors. 2. Acute encephalopathy, likely metabolic 2/2 UTI. Supportive measures. 3. Poor PO intake, last few weeks. Possibly related to above. 4. Large B cell lymphoma. On Chemo. 5. Hx COPD/asthma, not in exac. Cont home regimen. 6. HTN, cont home regimen. 7. HLD, cont statin. 8. GERD, cont PPI. DVT Prophylaxis: Lx  Diet: gen  Code Status: Full Code   PT/OT Eval Status: Will order if needed and as patient condition allows  Dispo - Admit to inpatient     Thierry Jamil MD    Thank you Prince Mcintosh MD for the opportunity to be involved in this patient's care. If you have any questions or concerns please feel free to contact me via the [employer] at [phone]. This chart was generated using the [address] [place] [address] [address]  [employer] system. I created this record but it may contain dictation errors given the limitations of this technology.

## 2021-11-09 NOTE — PROGRESS NOTES
Patient admitted to room _221_ from the ER. Patient oriented to room, call light, bed rails, phone, lights and bathroom. Patient instructed about the schedule of the day including: vital sign frequency, lab draws, possible tests, frequency of MD and staff rounds, daily weights, I &O's and prescribed diet. Bed alarm in place patient high fall risk. Telemetry box in place, patient aware of placement and reason. Bed locked, in lowest position, side rails up 2/4, call light within reach. Recliner Assessment:     Patient is not able to demonstrate the ability to move from a reclining position to an upright position within the recliner due to weakness. 4 Eyes Skin Assessment     The patient is being assess for   Admission    I agree that 2 RN's have performed a thorough Head to Toe Skin Assessment on the patient. ALL assessment sites listed below have been assessed. Areas assessed for pressure by both nurses:   [x]   Head, Face, and Ears   [x]   Shoulders, Back, and Chest, Abdomen  [x]   Arms, Elbows, and Hands   [x]   Coccyx, Sacrum, and Ischium  [x]   Legs, Feet, and Heels        Skin intact. Scattered bruises. Skin Assessed Under all Medical Devices by both nurses:  none              All Mepilex Borders were peeled back and area peeked at by both nurses:  No: none  Please list where Mepilex Borders are located:               **SHARE this note so that the co-signing nurse is able to place an eSignature**    Co-signer eSignature: Electronically signed by Baron Raquel RN on 11/9/21 at 3:16 AM EST    Does the Patient have Skin Breakdown related to pressure?   No     (Insert Photo here)         Pete Prevention initiated:  No   Wound Care Orders initiated:  No      [facility] nurse consulted for Pressure Injury (Stage 3,4, Unstageable, DTI, NWPT, Complex wounds)and New or Established Ostomies:  No      Primary Nurse eSignature: Electronically signed by Delroy Orr RN on 11/9/21 at 3:03 AM EST

## 2021-11-09 NOTE — PLAN OF CARE
Problem: Falls - Risk of:  Goal: Will remain free from falls  11/9/2021 1002 by Elizabeth Jon RN  Outcome: Ongoing  11/9/2021 0304 by Beny Angeles RN  Outcome: Ongoing  Goal: Absence of physical injury  11/9/2021 1002 by Elizabeth Jon RN  Outcome: Ongoing  11/9/2021 0304 by Beny Angeles RN  Outcome: Ongoing     Problem: Skin Integrity:  Goal: Will show no infection signs and symptoms  11/9/2021 1002 by Elizabeth Jon RN  Outcome: Ongoing  11/9/2021 0304 by Beny Angeles RN  Outcome: Ongoing  Goal: Absence of new skin breakdown  11/9/2021 1002 by Elizabeth Jon RN  Outcome: Ongoing  11/9/2021 0304 by Beny Angeles RN  Outcome: Ongoing     Problem: Infection:  Goal: Will remain free from infection  11/9/2021 1002 by Elizabeth Jon RN  Outcome: Ongoing  11/9/2021 0304 by Beny Angeles RN  Outcome: Ongoing     Problem: Safety:  Goal: Free from accidental physical injury  11/9/2021 1002 by Elizabeth Jon RN  Outcome: Ongoing  11/9/2021 0304 by Beny Angeles RN  Outcome: Ongoing  Goal: Free from intentional harm  11/9/2021 1002 by Elizabeth Jon RN  Outcome: Ongoing  11/9/2021 0304 by Beny Angeles RN  Outcome: Ongoing     Problem: Daily Care:  Goal: Daily care needs are met  11/9/2021 1002 by Elizabeth Jon RN  Outcome: Ongoing  11/9/2021 0304 by Beny Angeles RN  Outcome: Ongoing     Problem: Pain:  Goal: Patient's pain/discomfort is manageable  11/9/2021 1002 by Elizabeth Jon RN  Outcome: Ongoing  11/9/2021 0304 by Beny Angeles RN  Outcome: Ongoing     Problem: Skin Integrity:  Goal: Skin integrity will stabilize  11/9/2021 1002 by Elizabeth Jon RN  Outcome: Ongoing  11/9/2021 0304 by Beny Angeles RN  Outcome: Ongoing     Problem: Discharge Planning:  Goal: Patients continuum of care needs are met  11/9/2021 1002 by Elizabeth Jon RN  Outcome: Ongoing  11/9/2021 0304 by Beny Angeles RN  Outcome: Ongoing

## 2021-11-09 NOTE — CARE COORDINATION
Case Management Assessment  Initial Evaluation      Patient Name: Taras Griffith  YOB: 1950  Diagnosis: Encephalopathy [G93.40]  Urinary tract infection without hematuria, site unspecified [R20.0]  Acute metabolic encephalopathy [G42.02]  Date / Time: 11/8/2021  1:28 PM    Admission status/Date:11/8/21 inpt  Chart Reviewed: Yes      Patient Interviewed: Yes   Family Interviewed:  Yes - sister Rita      Hospitalization in the last 30 days:  No      Health Care Decision Maker :   Primary Decision Maker: Raj Sosa - Brother/Sister - [phone]    Secondary Decision Maker: Darrell Rankin - Child - [phone]    (CM - must 1st enter selection under Navigator - emergency contact- Health Care Decision Maker Relationship and pick relationship)   Who do you trust or have selected to make healthcare decisions for you      Met with:  Patient and spoke with sister via phone  Paige Anchors conducted  (bedside/phone):    Current PCP:  narendra    Financial  Commercial  Precert required for SNF : Y          3 night stay required - [employer]  Support Systems/Care Needs: Family Members  Transportation: family    Meal Preparation: self/family    Housing  Living Arrangements:  Lives 1 story home  Steps: 0  Intent for return to present living arrangements: Yes  Identified Issues:     [address] with [address] : No Agency:(Services)  Type of Home Care Services: None  Passport/Waiver : No  :                      Phone Number:    Passport/Waiver Services: N/A          Durable Medical Equiptment   DME Provider:   Equipment:   Walker___Cane___RTS___ BSC___Shower Chair___Hospital Bed___W/C____Otherrolliator  02 at ____Liter(s)---wears(frequency)_______ HHN ___ CPAP___ BiPap___   N/A____      Home O2 Use :  No    If No for home O2---if presently on O2 during hospitalization:  No  if yes CM to follow for potential DC O2 need  Informed of need for care provider to bring portable home O2 tank on day of discharge for nursing to connect prior to leaving:   Not Indicated  Verbalized agreement/Understanding:   Not Indicated    Community Service Affiliation  Dialysis:  No    · Agency:  · Location:  · Dialysis Schedule:  · Phone:   · Fax: Other Community Services: (ex:PT/OT,Mental Health,Wound Clinic, Cardio/Pul [address]) None    DISCHARGE PLAN: Explained Case Management role/services. Reviewed chart and spoke with pt at bedside. Pt acknowledges my presence and answers question With 1-2 words. Spoke with pt sister Rita to complete assessment. States pt lives in her own home with her niece who assist with care. States pt typically  Oriented and able to get around. Per sister no in home services. Discussed with sister home vs SNF and sister Mj Biswas if will come home or rehab. Per Nanda French is DPOA. Will cont to follow and complete dcp needs.

## 2021-11-09 NOTE — PROGRESS NOTES
Inpatient Occupational Therapy  Evaluation and Treatment    Unit: 2 [place]  Date:  11/9/2021  Patient Name:    Asmita Moreno  Admitting diagnosis:  Encephalopathy [G93.40]  Urinary tract infection without hematuria, site unspecified [I93.2]  Acute metabolic encephalopathy [V27.28]  Admit Date:  11/8/2021  Precautions/Restrictions/WB Status/ Lines/ Wounds/ Oxygen: Fall risk, Bed/chair alarm, Lines -IV and Purewick catheter, Confusion and Telemetry    Treatment Time:  1610- 1650  Treatment Number: 1   Timed code treatment minutes 30 minutes   Total Treatment minutes:   40  minutes    Patient Goals for Therapy:  \" not stated  \"      Discharge Recommendations: continue to assess and know more about home situation   DME needs for discharge: continue to assess       Therapy recommendations for staff:   Assist of 1 with use of rolling walker (RW) for all transfers and ambulation to/from [facility]  to/from chair    History of Present Illness: H&P  per [facility] 11-8-21  66 y.o. female  who presents to the ED complaining of confusion and decreased p.o. intake, per her sister who is at the bedside. She states the patient has been having waxing and waning mental status for the last month, with decreased oral intake. She does have large B-cell lymphoma and has been undergoing chemotherapy. They deny any recent fevers, chills, chest pain, shortness of breath, abdominal pain. During my evaluation the patient is able to verbalize responses to me, however she does not consistently follow commands. She is currently denying any pain. sister reported that the patient had a fall at home on Monday morning when she was ambulating from her bed to her bedside commode. Home Health S4 Level Recommendation:  Continue to assess  AM-PAC Score: 16  Preadmission Environment  Per the pt who is a questionable historian  Pt.  Lives with family (niece )  Home environment:  apartment   Steps to enter first floor: No steps  Steps to second floor: N/A  Bathroom: unknown  Equipment owned: RW    Preadmission Status:  Pt. Able to drive: No  Pt Fully independent with ADLs: Yes  Pt. Required assistance from family for: Cleaning, Cooking and Laundry   Pt. independent for transfers and gait and walked with No Device  History of falls No    Pain  No compaints         Cognition    A&O Person , pt could not state where she was, only stated the month of her , pt stated it was November   Able to follow 2 step commands    Subjective  Patient lying supine in bed with no family present. Pt agreeable to this OT eval & tx. Upper Extremity ROM:    WFL    Upper Extremity Strength:    WFL    Upper Extremity Sensation    WFL    Upper Extremity Proprioception:  WFL    Coordination and Tone  WFL    Balance  Functional Sitting Balance:  WFL  Functional Standing Balance:Diminished    Bed mobility:    Supine to sit:   SBA with VC and demonstration   Sit to supine:   SBA  Rolling:    Not Tested  Scooting in sitting:  SBA  Scooting to head of bed:   Not Tested    Bridging:   SBA    Transfers:    Sit to stand:  CGA  Stand to sit:  CGA  Bed to chair:   CGA with RW and gait belt   Standard toilet: Not Tested  Bed to [facility]:  Not Tested    Dressing:      UE:   Not Tested  LE:    Max A  to don socks     Bathing:    UE:  Not Tested  LE:  Not Tested    Eating:   Not Tested    Toileting:  Not Tested    Activity Tolerance   Pt completed therapy session with confusion   BP supine 135/76 HR 97 Sp02 95%   Positioning Needs:   Pt in bed, alarm set, positioned in proper neutral alignment and pressure relief provided. Exercise / Activities Initiated:   N/A    Patient/Family Education:   Role of OT    Assessment of Deficits: Pt seen for Occupational therapy evaluation in acute care setting. Pt demonstrated decreased Activity tolerance, ADLs, Balance , Bed mobility, Safety Awareness, Transfers and Cognition.  Pt functioning below baseline and will likely benefit from skilled

## 2021-11-09 NOTE — FLOWSHEET NOTE
11/09/21 1330   Vital Signs   Temp 98.9 °F (37.2 °C)   Temp Source Oral   Pulse 95   Heart Rate Source Monitor   Resp 18   /79   BP Location Right upper arm   Patient Position Semi fowlers   Level of Consciousness Alert (0)   MEWS Score 1   Patient Currently in Pain Denies   Oxygen Therapy   SpO2 97 %   O2 Device None (Room air)     Patient resting quietly in bed, respirations easy and even. VSS. Myra Fire at bedside. Patient refused lunch, took in her fluids. IVF infusing as ordered. Turned and repositioned. Bed bath given. Bed in low position. Call bell within reach. Tele sitter in room. Will continue to monitor.

## 2021-11-09 NOTE — PROGRESS NOTES
RT Inhaler-Nebulizer Bronchodilator Protocol Note    There is a bronchodilator order in the chart from a provider indicating to follow the RT Bronchodilator Protocol and there is an Initiate RT Inhaler-Nebulizer Bronchodilator Protocol order as well (see protocol at bottom of note). CXR Findings:  No results found. The findings from the last RT Protocol Assessment were as follows:   History Pulmonary Disease: None or smoker <15 pack years  Respiratory Pattern: Regular pattern and RR 12-20 bpm  Breath Sounds: Slightly diminished and/or crackles  Cough: Strong, productive  Indication for Bronchodilator Therapy: None  Bronchodilator Assessment Score: 3    Aerosolized bronchodilator medication orders have been revised according to the RT Inhaler-Nebulizer Bronchodilator Protocol below. Respiratory Therapist to perform RT Therapy Protocol Assessment initially then follow the protocol. Repeat RT Therapy Protocol Assessment PRN for score 0-3 or on second treatment, BID, and PRN for scores above 3. No Indications - adjust the frequency to every 6 hours PRN wheezing or bronchospasm, if no treatments needed after 48 hours then discontinue using Per Protocol order mode. If indication present, adjust the RT bronchodilator orders based on the Bronchodilator Assessment Score as indicated below. Use Inhaler orders unless patient has one or more of the following: on home nebulizer, not able to hold breath for 10 seconds, is not alert and oriented, cannot activate and use MDI correctly, or respiratory rate 25 breaths per minute or more, then use the equivalent nebulizer order(s) with same Frequency and PRN reasons based on the score. If a patient is on this medication at home then do not decrease Frequency below that used at home.     0-3 - enter or revise RT bronchodilator order(s) to equivalent RT Bronchodilator order with Frequency of every 4 hours PRN for wheezing or increased work of breathing using Per Protocol order mode. 4-6 - enter or revise RT Bronchodilator order(s) to two equivalent RT bronchodilator orders with one order with BID Frequency and one order with Frequency of every 4 hours PRN wheezing or increased work of breathing using Per Protocol order mode. 7-10 - enter or revise RT Bronchodilator order(s) to two equivalent RT bronchodilator orders with one order with TID Frequency and one order with Frequency of every 4 hours PRN wheezing or increased work of breathing using Per Protocol order mode. 11-13 - enter or revise RT Bronchodilator order(s) to one equivalent RT bronchodilator order with QID Frequency and an Albuterol order with Frequency of every 4 hours PRN wheezing or increased work of breathing using Per Protocol order mode. Greater than 13 - enter or revise RT Bronchodilator order(s) to one equivalent RT bronchodilator order with every 4 hours Frequency and an Albuterol order with Frequency of every 2 hours PRN wheezing or increased work of breathing using Per Protocol order mode.        Electronically signed by Peg Hart RCP on 11/8/2021 at 11:38 PM

## 2021-11-09 NOTE — PROGRESS NOTES
Progress Note    Admit Date:  11/8/2021    66year old female with COPD/asthma, hypertension, hyperlipidemia, hypothyroidism that presented to [facility] with altered mental status. 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 Admitted to [facility] 10/16-10/23. CT with intra-abdominal masses with obstructive uropathy. Retroperitoneal mass was biopsied. Pathology showed high grade B-cell lymphoma. Port placed. Chemo on 10/21. Seen by urology there. Held off on placement of nephrostomy tubes unless urine output declines or she develops renal failure. She was discharged to SNF. Subjective:  Ms. Odom Peers in bed. Denies any complaints. Per sister at bedside. Confusion started prior to being diagnosed with B-Cell lymphoma. Undergoing chemotherapy. She has had one treatment. She was recently admitted to [facility]. She states she was being treated for UTI and confusion there. She was not discharged on any antibiotics per sister. She went to SNF, then home with home care. Patient has had continued confusion. Objective:   Patient Vitals for the past 4 hrs:   BP Temp Temp src Pulse Resp SpO2   11/09/21 0827 -- -- -- -- -- 96 %   11/09/21 0815 135/86 98.4 °F (36.9 °C) Oral 112 18 96 %            Intake/Output Summary (Last 24 hours) at 11/9/2021 1139  Last data filed at 11/9/2021 0907  Gross per 24 hour   Intake 1698.05 ml   Output 300 ml   Net 1398.05 ml       Physical Exam:  Gen: No distress. Alert. Eyes: PERRL. No sclera icterus. No conjunctival injection. ENT: No discharge. Pharynx clear. Neck:  Trachea midline. Right chest POC  Resp: No accessory muscle use. No crackles. No wheezes. No rhonchi. CV: Regular rate. Regular rhythm. No murmur. No rub. No edema. Capillary Refill: Brisk,< 3 seconds   Peripheral Pulses: +2 palpable, equal bilaterally   GI: Non-tender. Non-distended. Normal bowel sounds. No hernia.    Skin: Warm and dry. No nodule on exposed extremities. No rash on exposed extremities. M/S: No cyanosis. No joint deformity. No clubbing. Neuro: Awake. Grossly nonfocal    Psych: Oriented to person. Disoriented to time, place, situation. No anxiety or agitation    Data:  CBC:   Recent Labs     11/08/21  1418 11/09/21  0649   WBC 15.9* 13.8*   HGB 10.8* 9.7*   HCT 33.4* 30.3*   MCV 83.0 84.0   * 405     BMP:   Recent Labs     11/08/21  1418 11/09/21  0649    137   K 4.2 3.7    104   CO2 24 18*   BUN 13 9   CREATININE 0.7 <0.5*     LIVER PROFILE:   Recent Labs     11/08/21  1418 11/09/21  0649   AST 13* 14*   ALT 14 13   BILITOT 0.3 <0.2   ALKPHOS 88 76     PT/INR: No results for input(s): PROTIME, INR in the last 72 hours. CULTURES  Urine: E. Coli  COVID/Influenza: not detected  Blood: pending    RADIOLOGY  CT Cervical Spine WO Contrast   Final Result   No acute abnormality of the cervical spine. CT Head WO Contrast   Preliminary Result   No evidence of acute intracranial abnormality on a study limited by motion   artifact as described above. Assessment/Plan:  Sepsis, POA  - tachycardia, Leukocytosis due to UTI  - Leukocytosis improving  - Blood cx pending, IVF's. - rocephin D#2    Acute metabolic encephalopathy  - due to UTI. Monitor for improvement. UTI, E. Coli  - IV Rocephin D#2  - urine cx with E. Coli    Poor PO intake  - IVF's. - dietician consult    Large B Cell Lymphoma  - recently diagnosed. Has POC. Has received 1 chemotherapy treatment. - Due for chemo tomorrow  - F/w Girnius    COPD/asthma  - stable. No AE. Continue Symbicort. Albuterol prn    Hypertension  - BP stable. Continue Toprol-XL, Amlodipine. Hyperlipidemia  - on Pravastatin. GERD  - on PPI. 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 Admitted to [facility] 10/16-10/23.   CT with intra-abdominal masses with obstructive uropathy. Retroperitoneal mass was biopsied. Pathology showed high grade B-cell lymphoma. Port placed. Chemo on 10/21. Seen by urology there. Held off on placement of nephrostomy tubes unless urine output declines or she develops renal failure. She was discharged to SNF.     PT/OT consult    DVT Prophylaxis: Lovenox  Diet: ADULT DIET; Easy to Chew  Code Status: Full Code    Tutu Carlson FNP-C  11/9/2021

## 2021-11-10 ENCOUNTER — APPOINTMENT (OUTPATIENT)
Dept: MRI IMAGING | Age: 71
DRG: 871 | End: 2021-11-10
Payer: MEDICARE

## 2021-11-10 PROBLEM — E44.0 MODERATE PROTEIN-CALORIE MALNUTRITION (HCC): Status: ACTIVE | Noted: 2021-11-10

## 2021-11-10 LAB
ORGANISM: ABNORMAL
URINE CULTURE, ROUTINE: ABNORMAL

## 2021-11-10 PROCEDURE — 6360000004 HC RX CONTRAST MEDICATION: Performed by: INTERNAL MEDICINE

## 2021-11-10 PROCEDURE — 94761 N-INVAS EAR/PLS OXIMETRY MLT: CPT

## 2021-11-10 PROCEDURE — 2580000003 HC RX 258: Performed by: INTERNAL MEDICINE

## 2021-11-10 PROCEDURE — 97535 SELF CARE MNGMENT TRAINING: CPT

## 2021-11-10 PROCEDURE — 99233 SBSQ HOSP IP/OBS HIGH 50: CPT | Performed by: INTERNAL MEDICINE

## 2021-11-10 PROCEDURE — 6370000000 HC RX 637 (ALT 250 FOR IP): Performed by: NURSE PRACTITIONER

## 2021-11-10 PROCEDURE — 97162 PT EVAL MOD COMPLEX 30 MIN: CPT

## 2021-11-10 PROCEDURE — 6360000002 HC RX W HCPCS: Performed by: INTERNAL MEDICINE

## 2021-11-10 PROCEDURE — 97116 GAIT TRAINING THERAPY: CPT

## 2021-11-10 PROCEDURE — 6370000000 HC RX 637 (ALT 250 FOR IP): Performed by: INTERNAL MEDICINE

## 2021-11-10 PROCEDURE — A9579 GAD-BASE MR CONTRAST NOS,1ML: HCPCS | Performed by: INTERNAL MEDICINE

## 2021-11-10 PROCEDURE — 70553 MRI BRAIN STEM W/O & W/DYE: CPT

## 2021-11-10 PROCEDURE — 92610 EVALUATE SWALLOWING FUNCTION: CPT

## 2021-11-10 PROCEDURE — 97530 THERAPEUTIC ACTIVITIES: CPT

## 2021-11-10 PROCEDURE — 94640 AIRWAY INHALATION TREATMENT: CPT

## 2021-11-10 PROCEDURE — 1200000000 HC SEMI PRIVATE

## 2021-11-10 RX ORDER — OXYCODONE HYDROCHLORIDE 5 MG/1
5 TABLET ORAL EVERY 4 HOURS PRN
Status: DISCONTINUED | OUTPATIENT
Start: 2021-11-10 | End: 2021-11-12 | Stop reason: HOSPADM

## 2021-11-10 RX ADMIN — DOCUSATE SODIUM 100 MG: 100 CAPSULE ORAL at 05:22

## 2021-11-10 RX ADMIN — SODIUM CHLORIDE: 9 INJECTION, SOLUTION INTRAVENOUS at 13:24

## 2021-11-10 RX ADMIN — GADOTERIDOL 12 ML: 279.3 INJECTION, SOLUTION INTRAVENOUS at 12:46

## 2021-11-10 RX ADMIN — METOPROLOL SUCCINATE 100 MG: 50 TABLET, EXTENDED RELEASE ORAL at 09:13

## 2021-11-10 RX ADMIN — PRAVASTATIN SODIUM 40 MG: 40 TABLET ORAL at 09:13

## 2021-11-10 RX ADMIN — ASPIRIN 81 MG: 81 TABLET, COATED ORAL at 09:13

## 2021-11-10 RX ADMIN — OXYCODONE 5 MG: 5 TABLET ORAL at 14:02

## 2021-11-10 RX ADMIN — SODIUM CHLORIDE 1000 ML: 9 INJECTION, SOLUTION INTRAVENOUS at 05:22

## 2021-11-10 RX ADMIN — OXYCODONE 5 MG: 5 TABLET ORAL at 20:57

## 2021-11-10 RX ADMIN — Medication 1 TABLET: at 09:13

## 2021-11-10 RX ADMIN — AMLODIPINE BESYLATE 5 MG: 5 TABLET ORAL at 09:13

## 2021-11-10 RX ADMIN — Medication 2 PUFF: at 07:59

## 2021-11-10 RX ADMIN — PANTOPRAZOLE SODIUM 40 MG: 40 TABLET, DELAYED RELEASE ORAL at 09:13

## 2021-11-10 RX ADMIN — CEFTRIAXONE SODIUM 1000 MG: 1 INJECTION, POWDER, FOR SOLUTION INTRAMUSCULAR; INTRAVENOUS at 17:12

## 2021-11-10 RX ADMIN — Medication 2 PUFF: at 19:00

## 2021-11-10 ASSESSMENT — PAIN SCALES - GENERAL
PAINLEVEL_OUTOF10: 7
PAINLEVEL_OUTOF10: 2
PAINLEVEL_OUTOF10: 6

## 2021-11-10 ASSESSMENT — PAIN DESCRIPTION - LOCATION: LOCATION: ABDOMEN;BACK

## 2021-11-10 ASSESSMENT — PAIN DESCRIPTION - PAIN TYPE: TYPE: CHRONIC PAIN

## 2021-11-10 NOTE — PROGRESS NOTES
Inpatient Physical Therapy Evaluation and Treatment    Unit: [facility]  Date:  11/10/2021  Patient Name:    Asmita Moreno  Admitting diagnosis:  Encephalopathy [G93.40]  Urinary tract infection without hematuria, site unspecified [O82.9]  Acute metabolic encephalopathy [E98.30]  Admit Date:  11/8/2021  Precautions/Restrictions/WB Status/ Lines/ Wounds/ Oxygen: Fall risk, Bed/chair alarm, Lines -IV and Purewick catheter and Confusion, Telesitter    Treatment Time:  14:48-15:23  Treatment Number:  1   Timed Code Treatment Minutes: 25 minutes  Total Treatment Minutes:  35 minutes    Patient Goals for Therapy: \" go home \". Pt's sister says family is open to SNF, however pt had a bad experience at prior SNF and wants to go home; when asked about home therapy, \"we can try it\"        Discharge Recommendations: Home 24 hr assist and with home PT  and Home PT  DME needs for discharge: Needs Met       Therapy recommendation for EMS Transport: can transport by wheelchair    Therapy recommendations for staff:   Assist of 1 with use of rolling walker (RW) and gait belt for all transfers and ambulation to/from [facility]  to/from Psychiatric    History of Present Illness: Per H&P Dr. Dean Jara 11/08: \"67 y.o. female  who presents to the ED complaining of confusion and decreased p.o. intake, per her sister who is at the bedside. She states the patient has been having waxing and waning mental status for the last month, with decreased oral intake. She does have large B-cell lymphoma and has been undergoing chemotherapy. They deny any recent fevers, chills, chest pain, shortness of breath, abdominal pain. During my evaluation the patient is able to verbalize responses to me, however she does not consistently follow commands. She is currently denying any pain. \"  \"Imaging is unremarkable and labs are suggestive of UTI. I suspect the patient has metabolic encephalopathy secondary to her UTI. She is treated with ceftriaxone and IV fluid.   At the patient's sister request, I am attempting to contact her oncologist to discuss the need for transfer for further evaluation. I did discuss with Dr. Rodo Zaragoza, who agreed the patient would be appropriate for hospitalization here with continued treatment of her UTI. \"    Home Health S4 Level Recommendation:  Level 1 Standard  AM-PAC Mobility Score       AM-PAC Inpatient Mobility without Stair Climbing Raw Score : 18    Preadmission Environment    Information provided by both pt and pt's sister. In some cases pt's sister provided different information than the pt. Pt. Lives with family (niece Mamadou Velasco). Mamadou Velasco is home at all times except for grocery runs, etc.  Home environment:    apartment   Steps to enter first floor: No steps  Steps to second floor: N/A  Bathroom: walk in shower with shower chair  Equipment owned: RW, SPC, BSC  Pt sleeps in flat non-adjustable bed.     Preadmission Status:  Pt. Able to drive: No  Pt Fully independent with ADLs: No. Since admission at [address] last month, niece has been helping with bathing and dressing. Pt. Required assistance from family for: Cleaning, Cooking and Laundry   Pt. supervision for transfers and gait and walked with RW. Niece helps \"sometimes\" with bed mobility. History of falls - Yes, 2 since discharge from SNF. Pain   No  Location: N/A  Rating: NA /10  Pain Medicine Status: Received pain med prior to tx    Cognition    A&O x4   Able to follow 1 step commands    Subjective  Patient lying supine in bed with sister Pat at bedside. Pt agreeable to this PT eval & tx. Upper Extremity ROM/Strength  Please see OT evaluation. Lower Extremity ROM / Strength   AROM WFL: Yes     Strength Assessment (measured on a 0-5 scale):  R LE   Quad   4-   Ant Tib  4   Hamstring 3+   Iliopsoas 3+  L LE  Quad   4-   Ant Tib  4   Hamstring 3+   Iliopsoas 3+    Lower Extremity Sensation    WFL    Lower Extremity Proprioception:   WFL    Coordination and Tone  WFL.  Mild resting tremors noted in bilat hands, R>L    Balance  Sitting:  Good ; Supervision  Comments: Good balance however limited endurance in unsupported sitting. Standing: Fair +; SBA  Comments: Moderate use of BUE support on walker. Limited endurance. Bed Mobility   Supine to Sit:    SBA  Sit to Supine:   Supervision  Rolling:   Not Tested  Scooting in sitting: Supervision  Scooting in supine:  Not Tested    Transfer Training     Sit to stand:   SBA  Stand to sit:   SBA  Bed to Chair:   SBA with use of gait belt and rolling walker (RW)    Gait gait completed as indicated below  Distance:      15 ft + 10 ft  Deviations (firm surface/linoleum):  decreased shaniqua, shuffles and decreased step length bilaterally  Assistive Device Used:    gait belt and rolling walker (RW)  Level of Assist:    SBA  Comment: Distance self-limited by report of fatigue. Stair Training deferred, pt does not have stairs in home environment    Activity Tolerance   Pt completed therapy session with No adverse symptoms noted w/activity    Positioning Needs   Pt in bed, alarm set, positioned in proper neutral alignment and pressure relief provided. Call light provided and all needs within reach    Exercises Initiated  all completed bilaterally unless indicated and completed in seated position  LAQ x 10 reps  marching x 10 reps  Heel/toe raises x 10 reps  Standing A/P sway - attempted, pt reported fatigue and requested return to bed. Other  None. Patient/Family Education   Pt educated on role of inpatient PT, POC, importance of continued activity, DC recommendations and calling for assist with mobility. Assessment  Pt seen for Physical Therapy evaluation in acute care setting. Pt demonstrated decreased Activity tolerance, Balance, Safety and Strength as well as decreased independence with Ambulation and Transfers.      Recommending Home 24 hr assist and with home PT upon discharge as patient functioning below baseline level and would benefit from continued therapy services. Goals : To be met in 3 visits:  1). Independent with LE Ex x 10 reps    To be met in 6 visits:  1). Supine to/from sit: Independent  2). Sit to/from stand: Modified Independent  3). Bed to chair: Modified Independent  4). Gait: Ambulate 50 ft.  with  Supervision and use of rolling walker (RW)  5). Tolerate B LE exercises 3 sets of 10-15 reps    Rehabilitation Potential: Fair  Strengths for achieving goals include:   Pt motivated, Family Support and Pt cooperative   Barriers to achieving goals include:    Complexity of condition    Plan    To be seen 3-5 x / week  while in acute care setting for therapeutic exercises, bed mobility, transfers, progressive gait training, balance training, and family/patient education. Signature: Mary Beth Nick, PT, DPT    If patient discharges from this facility prior to next visit, this note will serve as the Discharge Summary.

## 2021-11-10 NOTE — PROGRESS NOTES
Comprehensive Nutrition Assessment    Type and Reason for Visit:  Initial, Positive Nutrition Screen (poor po / wt loss)      Nutrition Recommendations/Plan:   1. Continue Easy to [employer]   2. Add ensure clear to meals      Nutrition Assessment:    Pt. moderately malnourished AEB she hs sustained an 11% weight los in 45 days after being dx with follicular Lymphoma with chemo tx in place; she has experienced significant reduction in her intakes in the last 45 days . At risk for further nutrition compromise r/t currently experience encephalopathy with dysphagia and eating < 50% . Will  Continue current diet and add ensure clear to meals . Malnutrition Assessment:  Malnutrition Status:   Moderate malnutrition    Context:  Acute Illness     Findings of the 6 clinical characteristics of malnutrition:  Energy Intake:  7 - 50% or less of estimated energy requirements for 5 or more days  Weight Loss:  1 - 7.5% over 3 months     Body Fat Loss:  No significant body fat loss Orbital   Muscle Mass Loss:  1 - Mild muscle mass loss Temples (temporalis), Clavicles (pectoralis & deltoids)  Fluid Accumulation:  No significant fluid accumulation Extremities   Strength:  Not Performed    Estimated Daily Nutrient Needs:  Energy (kcal):  1940-5418 based ~ 20-25 kcal/kg cbw; Weight Used for Energy Requirements:  Current     Protein (g):  60-70 based ~ 1.2-1.4 gr/kg ibw; Weight Used for Protein Requirements:  Ideal        Fluid (ml/day):  6601-3953; Method Used for Fluid Requirements:  1 ml/kcal      Nutrition Related Findings:  pt was lying in bed with IVFS infusing;  family at bedside provided most of the information; pt dx with Lymphoma and receiving chemo with ~ 19# wt loss in 45 days;  intakes ahve been minimal since dx; 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 9)       Nutrition Diagnosis:   · Mild malnutrition related to catabolic illness, cognitive or neurological impairment, inadequate protein-energy intake as evidenced by poor intake prior to admission, weight loss 7.5% in 3 months, mild muscle loss      Nutrition Interventions:   Food and/or Nutrient Delivery:  Continue Current Diet, Start Oral Nutrition Supplement  Nutrition Education/Counseling:  No recommendation at this time   Coordination of Nutrition Care:  Continue to monitor while inpatient    Goals:  pt will increase her intake of nutrition by consuming > 50 % of meals and supps       Nutrition Monitoring and Evaluation:   Behavioral-Environmental Outcomes:  None Identified   Food/Nutrient Intake Outcomes:  Food and Nutrient Intake, Supplement Intake  Physical Signs/Symptoms Outcomes:  Biochemical Data, Nutrition Focused Physical Findings, Weight     Discharge Planning:     Too soon to determine     Electronically signed by Gildardo Crews RD, LD on 11/10/21 at 6:41 PM EST    Contact: [phone]

## 2021-11-10 NOTE — PROGRESS NOTES
AM assessment complete. Gave am meds due with see mar. Refused lovenox. Alert to self. Knows in a hospital but not where. Not aware time. Calm/cooperative. Follows instruction. Telesitter in place. Bed check. Bed locked/lowest position. Call light within reach.

## 2021-11-10 NOTE — PROGRESS NOTES
strategies, and safe eating environment. Impression  Dysphagia Diagnosis: Mild oral stage dysphagia; Swallow function appears grossly intact  Dysphagia Outcome Severity Scale: Level 6: Within functional limits/Modified independence   Pt seen upright in bed, alert and agreeable to evaluation, RN OK'd SLP entry and evaluation. RN denied dysphagia with PO meds, reported pt declined breakfast.  Pt endorsed poor appetite lately. Pt oriented to self, place (\"hospital\", however, not name), and month. Pt disoriented to year and situation. Pt agreeable to minimal PO trials -- observed with consecutive sips of thin liquid via cup and straw, puree, and regular solid trials. Grossly timely swallow initiation throughout, no overt s/s of aspiration/penetration -- no coughing, throat clearing, wet vocal quality, change in RR, O2 sats, or increased WOB observed. Pt denied globus sensation post-swallow with all PO. Mildly reduced A-P bolus transit observed but considered WFL. No oral / lingual residue post-swallow. Swallow function appears grossly WFL. Recommend continuing pt's IDDSI 6 Easy to chew diet with IDDSI 0 Thin liquids, meds whole with TL or puree. RN aware of recs. ST to continue to follow. Vitals/labs:   Temp: n/a  SpO2: 95-97%  RR: 18/min  BP: 158/90  HR: 92  WBC: 13.8    Treatment Plan  Requires SLP Intervention: Yes  Duration/Frequency of Treatment: 2x/week for LOS  D/C Recommendations: To be determined (per PT/OT recs)    Recommended Diet and Intervention  Diet Solids Recommendation: IDDSI 6 Easy to Chew  Liquid Consistency Recommendation: IDDSI 0 Thin  Recommended Form of Meds: Meds whole with TL or puree  Recommendations: Dysphagia treatment  Therapeutic Interventions: Diet tolerance monitoring; Patient/Family education    Compensatory Swallowing Strategies  Compensatory Swallowing Strategies: Eat/Feed slowly;  Alternate solids and liquids; Upright as possible for all oral intake; Remain upright for 30-45 minutes after meals; Small bites/sips    Treatment/Goals  Short-term Goals  Timeframe for Short-term Goals: 2 days (11/12/21)  Long-term Goals  Timeframe for Long-term Goals: 3 days (11/13/21)  Goal 1: The pt will tolerate safest and least restrictive diet without clinical s/s of aspiration or change in respiratory status. Dysphagia Goals: The patient will tolerate recommended diet without observed clinical signs of aspiration; The patient/caregiver will demonstrate understanding of compensatory strategies for improved swallowing safety.; The patient will tolerate regular consistency solids 10/10. General  Chart Reviewed: Yes  Comments: Pt admitted d/t acute encephalopathy, UTI. Pt has PMHx of lymphoma and COPD per chart. MRI of the Brain ordered. Behavior/Cognition: Alert; Cooperative; Pleasant mood; Confused  Respiratory Status: Room air  O2 Device: None (Room air)  Communication Observation: Functional (Pleasantly confused)  Follows Directions: Simple  Dentition: Some missing teeth  Patient Positioning: Upright in bed  Baseline Vocal Quality: Normal  Volitional Cough: Weak  Prior Dysphagia History: POA reported that pt has a hx of \"swallowing difficulty\" per chart. Consistencies Administered: Reg solid; Dysphagia Pureed (Dysphagia I); Thin - cup; Thin - straw    Vision/Hearing  Vision  Vision: Within Functional Limits  Hearing  Hearing: Within functional limits    Oral Motor Deficits  Oral/Motor  Oral Motor: Within functional limits    Oral Phase Dysfunction  Oral Phase  Oral Phase: WFL  Oral Phase  Oral Phase - Comment: Mildly reduced A-P bolus transit observed but considered WFL. No oral / lingual residue post-swallow.      Indicators of Pharyngeal Phase Dysfunction   Pharyngeal Phase  Pharyngeal Phase: WFL    Prognosis  Prognosis  Prognosis for safe diet advancement: good  Barriers to reach goals: cognitive deficits  Individuals consulted  Consulted and agree with results and recommendations: Patient; RN    Education  Patient Education:Pt educated on reason for referral, role of ST, assessment results and recommendations. Patient Education Response: Verbalizes understanding; Needs reinforcement  Safety Devices in place: Yes  Type of devices: Call light within reach; Bed alarm in place;  Left in bed; Nurse notified       Therapy Time  SLP Individual Minutes  Time In: 1120  Time Out: 0119  Minutes: 16; dysphagia azucena Obando M.S. [address] [facility]  Speech-language pathologist  [id number]  Speech Desk Phone: [phone]

## 2021-11-10 NOTE — PROGRESS NOTES
Patient resting, eyes closed, respirations witnessed as e/e. No signs of distress. Bed alarm in place. Tele-sitter in room. Call light and bedside table is easy reach.

## 2021-11-10 NOTE — PROGRESS NOTES
Pt. Back from MRI. Restarted IV fluids. Placed purwick. No other needs at this time. Sister is in room.

## 2021-11-10 NOTE — PROGRESS NOTES
Micro report:  [facility] lab called to report the patient has a positive blood culture for E. Coli. Patient is currently on IV Ceftriaxone which should provide coverage for standard E. Coli. Rec to continue to monitor for C&S data and adjust if resistance is an issue.    Izabella Neely, [facility] PharmD 11/9/2021 8:31 PM

## 2021-11-10 NOTE — PLAN OF CARE
Problem: Nutrition  Intervention: Swallowing evaluation  Note: Bedside swallow evaluation completed this date. Dave Gavin M.S. [id number] [facility]  Speech-language pathologist  [id number]         Problem: Nutrition  Intervention: Aspiration precautions  Note: Bedside swallow evaluation completed this date.     Dave Gavin M.S. [id number] [facility]  Speech-language pathologist  [id number]

## 2021-11-10 NOTE — CARE COORDINATION
INTERDISCIPLINARY PLAN OF CARE CONFERENCE    Date/Time: 11/10/2021 11:17 AM  Completed by: Makayla Lugo RN, Case Management      Patient Name:  Gladys French  YOB: 1950  Admitting Diagnosis: Encephalopathy [G93.40]  Urinary tract infection without hematuria, site unspecified [E00.3]  Acute metabolic encephalopathy [S24.62]     Admit Date/Time:  11/8/2021  1:28 PM    Chart reviewed. Interdisciplinary team contacted or reviewed plan related to patient progress and discharge plans. Disciplines included Case Management, Nursing, and Dietitian. Current Status: Stable  PT/OT recommendation for discharge plan of care: N/A    Expected D/C Disposition:  Home  Confirmed plan with patient Yes    Met with: patient  Discharge Plan Comments:  Reviewed chart and met with pt at bedside. Pt awake and alert this AM. States niece lives with her and assist with care. States niece is retired and there 24/7. Discussed briefly dcp of STR vs home. Pt states wants to go home not STR. Will request Therapy eval to help determine needs. Will cont to follow. Spoke with sister and [facility]. Per Yuan Foreman would like pt to go to STR. And choices are [facility] and [facility]. Spoke with Benny Bryan at [facility] and no bed avaibility until next wek. Spoke with Luann at [facility] who will have bed. Faxed referral info. Luann to return call after review on acceptance/denial.    Pt sister will discuss need for STR with pt. CM to F/U with pt who is oriented to situation and place today and confirm she is agreeable to STR.      Home O2 in place on admit: No  Pt informed of need to bring portable home O2 tank on day of discharge for nursing to connect prior to leaving:  Not Indicated  Verbalized agreement/Understanding:  Not Indicated

## 2021-11-10 NOTE — PROGRESS NOTES
Progress Note    Admit Date:  11/8/2021    66year old female with COPD/asthma, hypertension, hyperlipidemia, hypothyroidism that presented to [facility] with altered mental status. 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 Admitted to [facility] 10/16-10/23. CT with intra-abdominal masses with obstructive uropathy. Retroperitoneal mass was biopsied. Pathology showed high grade B-cell lymphoma. Port placed. Chemo on 10/21. Seen by urology there. Held off on placement of nephrostomy tubes unless urine output declines or she develops renal failure. She was discharged to SNF. Patient now admitted with increasing confusion, UTI acute metabolic encephalopathy. Per sister at bedside. Confusion started prior to being diagnosed with B-Cell lymphoma. Undergoing chemotherapy. She has had one treatment. She was recently admitted to [facility]. She states she was being treated for UTI and confusion there. She was not discharged on any antibiotics per sister. She went to SNF, then home with home care. Patient has had continued confusion. Subjective:  Ms. Gabriela Davies seen laying in bed. Denies any complaints. Patient's mental status is better today. she is more awake and alert appears to be oriented to person. MRI of the brain pending  Stable on telemetry vital signs stable afebrile  Urine cultures greater than 100,000 E. 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 I have updated patient's sister/POA.   At bedside  Discussed with patient's nurse    Objective:   Patient Vitals for the past 4 hrs:   BP Temp Temp src Pulse Resp SpO2   11/10/21 0906 (!) 158/90 96.8 °F (36 °C) Oral 98 16 96 %            Intake/Output Summary (Last 24 hours) at 11/10/2021 1149  Last data filed at 11/10/2021 0327  Gross per 24 hour   Intake 1504.78 ml   Output 1500 ml   Net 4.78 ml       Physical Exam:  Gen: No distress. Alert. Awake. Less confused today. Oriented to place and person  Eyes: PERRL. No sclera icterus. No conjunctival injection. ENT: No discharge. Pharynx clear. Neck:  Trachea midline. Right chest POC  Resp: No accessory muscle use. No crackles. No wheezes. No rhonchi. CV: Regular rate. Regular rhythm. No murmur. No rub. No edema. Capillary Refill: Brisk,< 3 seconds   Peripheral Pulses: +2 palpable, equal bilaterally   GI: Non-tender. Non-distended. Normal bowel sounds. No hernia. Skin: Warm and dry. No nodule on exposed extremities. No rash on exposed extremities. M/S: No cyanosis. No joint deformity. No clubbing. Neuro: Awake. Grossly nonfocal    Psych: Oriented to person, place. disoriented to time, situation. No anxiety or agitation. Data:  CBC:   Recent Labs     11/08/21  1418 11/09/21  0649   WBC 15.9* 13.8*   HGB 10.8* 9.7*   HCT 33.4* 30.3*   MCV 83.0 84.0   * 405     BMP:   Recent Labs     11/08/21  1418 11/09/21  0649    137   K 4.2 3.7    104   CO2 24 18*   BUN 13 9   CREATININE 0.7 <0.5*     LIVER PROFILE:   Recent Labs     11/08/21  1418 11/09/21  0649   AST 13* 14*   ALT 14 13   BILITOT 0.3 <0.2   ALKPHOS 88 76     PT/INR: No results for input(s): PROTIME, INR in the last 72 hours. CULTURES  Urine: E. Coli. COVID/Influenza: not detected  Blood: pending    RADIOLOGY  MRI BRAIN W WO CONTRAST   Final Result   Findings worrisome for possible leptomeningeal carcinomatosis. The findings were sent to the Radiology Results [address] at 2:33   pm on 11/10/2021to be communicated to a licensed caregiver. CT Cervical Spine WO Contrast   Final Result   No acute abnormality of the cervical spine. CT Head WO Contrast   Final Result   No evidence of acute intracranial abnormality on a study limited by motion   artifact as described above.                Assessment/Plan:  Sepsis, POA  - tachycardia, Leukocytosis due to UTI  - Leukocytosis improving  - Blood cx pending, IVF's. - rocephin D#3  Sepsis improved    Acute metabolic encephalopathy  - due to UTI. Work-up also suggesting brain metastasis now.  -Antibiotics as above,  monitor for improvement. Mental status is a little better today    UTI, E. Coli  -Continue IV Rocephin D#3  - urine cx with E. Coli. Greater than 100,000, pansensitive    Poor PO intake  - IVF's. - dietician consult    Large B Cell Lymphoma  Possible brain metastasis  - recently diagnosed. Has POC. She is seeing oncologist at [address] [place] [address], she has received 1 chemotherapy treatment.    -Was due for second chemo this week. -MRI of the brain just reported and she has leptomeningeal carcinomatosis  -Discussed with patient's sister - her POA . Plan is follow-up with oncologist ,F/w Marcia    COPD/asthma  - stable. No AE. Continue Symbicort. Albuterol prn    Hypertension  - BP stable. Continue Toprol-XL, Amlodipine. Hyperlipidemia  - on Pravastatin. GERD  - on PPI. PT/OT consult    DVT Prophylaxis: Lovenox  Diet: ADULT DIET; Easy to Chew  Code Status: DNR-CCA        Total time today 34 minutes.  > 50 % time dominated by counseling/ coordination of care and D/W family, RN       Merary Johnson MD  11/10/2021

## 2021-11-10 NOTE — PLAN OF CARE
Problem: Falls - Risk of:  Goal: Will remain free from falls  Description: Will remain free from falls  11/10/2021 0116 by Beny Angeles RN  Outcome: Ongoing  Goal: Absence of physical injury  Description: Absence of physical injury  11/10/2021 0116 by Beny Angeles RN  Outcome: Ongoing     Problem: Skin Integrity:  Goal: Will show no infection signs and symptoms  Description: Will show no infection signs and symptoms  11/10/2021 0116 by Beny Angeles RN  Outcome: Ongoing  Goal: Absence of new skin breakdown  Description: Absence of new skin breakdown  11/10/2021 0116 by Beny Angeles RN  Outcome: Ongoing     Problem: Infection:  Goal: Will remain free from infection  Description: Will remain free from infection  11/10/2021 0116 by Beny Angeles RN  Outcome: Ongoing     Problem: Safety:  Goal: Free from accidental physical injury  Description: Free from accidental physical injury  11/10/2021 0116 by Beny Angeles RN  Outcome: Ongoing  Goal: Free from intentional harm  Description: Free from intentional harm  11/10/2021 0116 by Beny Angeles RN  Outcome: Ongoing     Problem: Daily Care:  Goal: Daily care needs are met  Description: Daily care needs are met  11/10/2021 0116 by Beny Angeles RN  Outcome: Ongoing     Problem: Pain:  Goal: Patient's pain/discomfort is manageable  Description: Patient's pain/discomfort is manageable  11/10/2021 0116 by Beny Angeles RN  Outcome: Ongoing     Problem: Skin Integrity:  Goal: Skin integrity will stabilize  Description: Skin integrity will stabilize  11/10/2021 0116 by Beny Angeles RN  Outcome: Ongoing     Problem: Discharge Planning:  Goal: Patients continuum of care needs are met  Description: Patients continuum of care needs are met  11/10/2021 0116 by Beny Angeles RN  Outcome: Ongoing

## 2021-11-10 NOTE — PROGRESS NOTES
Shift assessment complete. See doc flow. No nightly medications given see MAR. Home meds were not verified on day shift today when the pts sister Danay Chaves her POA was here visiting. Port to Right chest. Patient currently undergoing chemo treatments at [facility]. Patient oriented to self only. Patient with no complaints at this time. Bed alarm on. Tele-sitter in room. Call light and bedside table within easy reach.

## 2021-11-10 NOTE — PROGRESS NOTES
received a call for a critical result R/T pt MRI , notified DR Kimmy Skinner of report and updated pt primary nurse.

## 2021-11-11 LAB
ANION GAP SERPL CALCULATED.3IONS-SCNC: 14 MMOL/L (ref 3–16)
BUN BLDV-MCNC: 4 MG/DL (ref 7–20)
CALCIUM SERPL-MCNC: 7.6 MG/DL (ref 8.3–10.6)
CHLORIDE BLD-SCNC: 102 MMOL/L (ref 99–110)
CO2: 20 MMOL/L (ref 21–32)
CREAT SERPL-MCNC: <0.5 MG/DL (ref 0.6–1.2)
GFR AFRICAN AMERICAN: >60
GFR NON-AFRICAN AMERICAN: >60
GLUCOSE BLD-MCNC: 74 MG/DL (ref 70–99)
HCT VFR BLD CALC: 30.2 % (ref 36–48)
HEMOGLOBIN: 9.9 G/DL (ref 12–16)
MAGNESIUM: 1.7 MG/DL (ref 1.8–2.4)
MCH RBC QN AUTO: 26.9 PG (ref 26–34)
MCHC RBC AUTO-ENTMCNC: 32.7 G/DL (ref 31–36)
MCV RBC AUTO: 82.4 FL (ref 80–100)
PDW BLD-RTO: 16.6 % (ref 12.4–15.4)
PLATELET # BLD: 438 K/UL (ref 135–450)
PMV BLD AUTO: 7.4 FL (ref 5–10.5)
POTASSIUM REFLEX MAGNESIUM: 3.5 MMOL/L (ref 3.5–5.1)
RBC # BLD: 3.67 M/UL (ref 4–5.2)
SODIUM BLD-SCNC: 136 MMOL/L (ref 136–145)
WBC # BLD: 14.3 K/UL (ref 4–11)

## 2021-11-11 PROCEDURE — 80048 BASIC METABOLIC PNL TOTAL CA: CPT

## 2021-11-11 PROCEDURE — 94761 N-INVAS EAR/PLS OXIMETRY MLT: CPT

## 2021-11-11 PROCEDURE — 2580000003 HC RX 258: Performed by: INTERNAL MEDICINE

## 2021-11-11 PROCEDURE — 83735 ASSAY OF MAGNESIUM: CPT

## 2021-11-11 PROCEDURE — 6360000002 HC RX W HCPCS: Performed by: INTERNAL MEDICINE

## 2021-11-11 PROCEDURE — 6370000000 HC RX 637 (ALT 250 FOR IP): Performed by: INTERNAL MEDICINE

## 2021-11-11 PROCEDURE — 99232 SBSQ HOSP IP/OBS MODERATE 35: CPT | Performed by: INTERNAL MEDICINE

## 2021-11-11 PROCEDURE — 97530 THERAPEUTIC ACTIVITIES: CPT

## 2021-11-11 PROCEDURE — 94640 AIRWAY INHALATION TREATMENT: CPT

## 2021-11-11 PROCEDURE — 6370000000 HC RX 637 (ALT 250 FOR IP): Performed by: NURSE PRACTITIONER

## 2021-11-11 PROCEDURE — 1200000000 HC SEMI PRIVATE

## 2021-11-11 PROCEDURE — 97110 THERAPEUTIC EXERCISES: CPT

## 2021-11-11 PROCEDURE — 85027 COMPLETE CBC AUTOMATED: CPT

## 2021-11-11 PROCEDURE — 92526 ORAL FUNCTION THERAPY: CPT

## 2021-11-11 RX ADMIN — Medication 1 TABLET: at 09:00

## 2021-11-11 RX ADMIN — ASPIRIN 81 MG: 81 TABLET, COATED ORAL at 09:00

## 2021-11-11 RX ADMIN — Medication 2 PUFF: at 07:58

## 2021-11-11 RX ADMIN — OXYCODONE 5 MG: 5 TABLET ORAL at 14:46

## 2021-11-11 RX ADMIN — ENOXAPARIN SODIUM 40 MG: 100 INJECTION SUBCUTANEOUS at 09:00

## 2021-11-11 RX ADMIN — ACETAMINOPHEN 650 MG: 325 TABLET ORAL at 18:01

## 2021-11-11 RX ADMIN — DOCUSATE SODIUM 100 MG: 100 CAPSULE ORAL at 06:42

## 2021-11-11 RX ADMIN — OXYCODONE 5 MG: 5 TABLET ORAL at 02:12

## 2021-11-11 RX ADMIN — OXYCODONE 5 MG: 5 TABLET ORAL at 21:39

## 2021-11-11 RX ADMIN — AMLODIPINE BESYLATE 5 MG: 5 TABLET ORAL at 09:00

## 2021-11-11 RX ADMIN — SODIUM CHLORIDE: 9 INJECTION, SOLUTION INTRAVENOUS at 02:08

## 2021-11-11 RX ADMIN — Medication 2 PUFF: at 19:30

## 2021-11-11 RX ADMIN — PANTOPRAZOLE SODIUM 40 MG: 40 TABLET, DELAYED RELEASE ORAL at 09:00

## 2021-11-11 RX ADMIN — CEFTRIAXONE SODIUM 1000 MG: 1 INJECTION, POWDER, FOR SOLUTION INTRAMUSCULAR; INTRAVENOUS at 16:19

## 2021-11-11 RX ADMIN — PRAVASTATIN SODIUM 40 MG: 40 TABLET ORAL at 09:00

## 2021-11-11 RX ADMIN — SODIUM CHLORIDE: 9 INJECTION, SOLUTION INTRAVENOUS at 16:19

## 2021-11-11 RX ADMIN — METOPROLOL SUCCINATE 100 MG: 50 TABLET, EXTENDED RELEASE ORAL at 09:00

## 2021-11-11 ASSESSMENT — PAIN DESCRIPTION - PAIN TYPE
TYPE: CHRONIC PAIN
TYPE: CHRONIC PAIN

## 2021-11-11 ASSESSMENT — PAIN SCALES - GENERAL
PAINLEVEL_OUTOF10: 0
PAINLEVEL_OUTOF10: 0
PAINLEVEL_OUTOF10: 7
PAINLEVEL_OUTOF10: 7
PAINLEVEL_OUTOF10: 5
PAINLEVEL_OUTOF10: 6

## 2021-11-11 ASSESSMENT — PAIN DESCRIPTION - DESCRIPTORS: DESCRIPTORS: ACHING

## 2021-11-11 ASSESSMENT — PAIN DESCRIPTION - ORIENTATION: ORIENTATION: MID

## 2021-11-11 ASSESSMENT — PAIN DESCRIPTION - LOCATION
LOCATION: ABDOMEN
LOCATION: ABDOMEN;BACK
LOCATION: ABDOMEN;BACK

## 2021-11-11 NOTE — PROGRESS NOTES
Physical Therapy  Inpatient Physical Therapy Daily Treatment Note    Unit: [facility]  Date:  11/11/2021  Patient Name:    Zay Awan  Admitting diagnosis:  Encephalopathy [G93.40]  Urinary tract infection without hematuria, site unspecified [H42.0]  Acute metabolic encephalopathy [T86.10]  Admit Date:  11/8/2021  Precautions/Restrictions:  Fall risk, Bed/chair alarm, Lines -IV and Purewick catheter, Confusion and telesitter      Discharge Recommendations: Home 24 hr assist and with home PT   DME needs for discharge: Needs Met       Therapy recommendation for EMS Transport: can transport by wheelchair    Therapy recommendations for staff:   Assist of 1 with use of rolling walker (RW) and gait belt for all transfers and ambulation to/from [facility]  to/from [facility]    History of Present Illness: Per H&P Dr. Hodan Dillard 11/08: \"78 y. o. female  who presents to the ED complaining of confusion and decreased p.o. intake, per her sister who is at the bedside.  She states the patient has been having waxing and waning mental status for the last month, with decreased oral intake.  She does have large B-cell lymphoma and has been undergoing chemotherapy. Albert Chavez deny any recent fevers, chills, chest pain, shortness of breath, abdominal pain.  During my evaluation the patient is able to verbalize responses to me, however she does not consistently follow commands.  She is currently denying any pain. \"  \"Imaging is unremarkable and labs are suggestive of UTI.  I suspect the patient has metabolic encephalopathy secondary to her UTI. Reagan Magana is treated with ceftriaxone and IV fluid. At the patient's sister request, I am attempting to contact her oncologist to discuss the need for transfer for further evaluation. I did discuss with Dr. Leann Miller agreed the patient would be appropriate for hospitalization here with continued treatment of her UTI. \"    Home Health S4 Level Recommendation: Level 1 Standard  AM-PAC Mobility Score      AM-PAC Inpatient Mobility without Stair Climbing Raw Score : 18    Treatment Time:  5061-3715  Treatment number: 2  Timed Code Treatment Minutes: 23 minutes  Total Treatment Minutes:  23  minutes    Cognition    A&O orientation not directly assessed. Able to follow 2 step commands    Subjective  Patient lying supine in bed with family at bedside   Pt agreeable to this PT tx. Pain   Yes  Location: back and stomach  Ratin/10  Pain Medicine Status: Received pain med prior to tx     Bed Mobility   Supine to Sit:    SBA  Sit to Supine:   Not Tested  Rolling:   Not Tested  Scooting:   Supervision   Comment: increased time to complete     Transfer Training     Sit to stand:   SBA  Stand to sit:   SBA  Bed to Chair:   SBA with use of gait belt and rolling walker (RW)   Comment: STS from EOB x2 reps; increased time to complete. Gait Training gait deferred due to fatigue; pt ambulated 0 ft. Stair Training deferred, pt unsafe/not appropriate to complete stairs at this time    Therapeutic Exercise all completed bilaterally unless indicated and completed in seated position  Ankle Pumps x 5 reps  LAQ x 5 reps  marching x 5 reps   Comment: max encouragement to complete     Balance  Sitting:  Good ; Supervision  Comments: sitting EOB    Standing: Good - ; SBA  Comments: with RW    Patient Education      Role of PT, POC, Discharge recommendations, safety awareness, transfer techniques, pacing activity, HEP and calling for assist with mobility. Positioning Needs       Pt reclined in chair, alarm set, positioned in proper neutral alignment and pressure relief provided. Call light provided and all needs within reach    Activity Tolerance   Pt completed therapy session with No adverse symptoms noted w/activity. At rest: /84, HR 98 bpm, SpO2 95% on RA   With activity:  bpm, SpO2 96% on RA    Assessment :  Patient limited by fatigue this date.  Pt tolerates bed mobility, transfers and minimal seated exercises with max encouragement. Pt would benefit from continued skilled PT to address deficits listed above and maximize independence and strength  Recommending Home 24 hr assist and with home PT upon discharge as patient functioning below baseline level and would benefit from continued therapy services    Goals (all goals ongoing unless otherwise indicated)  To be met in 3 visits:  1). Independent with LE Ex x 10 reps     To be met in 6 visits:  1). Supine to/from sit: Independent  2). Sit to/from stand: Modified Independent  3). Bed to chair: Modified Independent  4). Gait: Ambulate 50 ft.  with  Supervision and use of rolling walker (RW)  5). Tolerate B LE exercises 3 sets of 10-15 reps    Plan   Continue with plan of care. Signature: Taryn Elkins, PT, DPT      If patient discharges from this facility prior to next visit, this note will serve as the Discharge Summary.

## 2021-11-11 NOTE — PROGRESS NOTES
PT/OT working with patient. Patient c/o abdomin pain, Oxycodone 5 mg PO given. Patient rated pain 7/10.

## 2021-11-11 NOTE — PROGRESS NOTES
Bedside report given and pt care transferred to [facility]. Pt denies needs at this time. Call light within reach.

## 2021-11-11 NOTE — PLAN OF CARE
Problem: Safety:  Goal: Free from accidental physical injury  Outcome: Ongoing     Problem: Daily Care:  Goal: Daily care needs are met  Outcome: Ongoing   All care needs met. Bed alarm on for safety due to previous falls prior to admission.

## 2021-11-11 NOTE — DISCHARGE INSTR - COC
Continuity of Care Form      Discharging to Facility/ Agency   Name: BETTE KIM  Phone: [address]: LEVEL 1 STANDARD    Home health agency to establish plan of care for patient over 60 day period   Nursing  Initial home SN evaluation visit to occur within 24-48 hours for:  medication management  VS and clinical assessment  S&S chronic disease exacerbation education + when to contact MD / NP  care coordination  Medication Reconciliation during 1st SN visit       PT/OT   Evaluate with goal of regaining prior level of functioning   OT to evaluate if patient has [address] needs for personal care      PCP Visit scheduled within 7 days of hospital discharge       / signature: Electronically signed by Cary Acosta RN on 11/11/21 at 12:09 PM EST    PHYSICIAN SECTION    Prognosis: Fair    Condition at Discharge: Stable    Rehab Potential (if transferring to Rehab): Good    Recommended Labs or Other Treatments After Discharge: SN, PT/OT  HCV and Zoom Programs  OK for [facility] 11/15 [facility]    Physician Certification: I certify the above information and transfer of Abran Lee  is necessary for the continuing treatment of the diagnosis listed and that she requires Home Care for less 30 days.      Update Admission H&P: No change in H&P    PHYSICIAN SIGNATURE:  Electronically signed by Cr Carlos MD on 11/12/21 at 1:33 PM EST

## 2021-11-11 NOTE — PROGRESS NOTES
Patient family stated \"She got back in bed by herself, so the bed alarm isn't on\". Bed alarm on at this time. Patient resting quietly in bed. Call light in reach.

## 2021-11-11 NOTE — CARE COORDINATION
DISCHARGE ORDER  Date/Time 2021 12:04 PM  Completed by: López Do RN, Case Management    Patient Name: Niurka Stroud      : 1950  Admitting Diagnosis: Encephalopathy [G93.40]  Urinary tract infection without hematuria, site unspecified [K27.6]  Acute metabolic encephalopathy [Y41.36]      Admit order Date and Status:21  (verify MD's last order for status of admission)      Noted discharge order. If applicable PT/OT recommendation at Discharge: home with [address] recommendation by PT/OT:none  Confirmed discharge plan: Yes  with Pt sister Karmen Mortimer  If pt confirmed DC plan does family need to be contacted by CM No  Discharge Plan: home with [place] [id number] services through [facility]. Reviewed chart. Role of discharge planner explained and patient verbalized understanding. Discharge order is noted. Has Home O2 in place on admit:  No  Informed of need to bring portable home O2 tank on day of discharge for nursing to connect prior to leaving:   No  Verbalized agreement/Understanding:   Not Indicated  Pt is being d/c'd to home today. Pt's O2 sats are 97% on RA. Discharge timeout done with all disciplines. All discharge needs and concerns addressed.

## 2021-11-11 NOTE — PROGRESS NOTES
Shift assessment complete. See doc flow. No nightly medications scheduled see MAR. IVF infusing. Patient c/o abdominal and back pain PRN Oxy given. Pur-wick in place for urine output. Bed alarm on. Tele-sitter in the room. Call light and bedside table within easy reach.

## 2021-11-11 NOTE — PROGRESS NOTES
Speech Language Pathology  Facility/Department: [address] [place] [address] [address] [place] [facility]  Dysphagia Daily Treatment Note    NAME: Jake Caputo  : 1950  MRN: [id number]     Recommended Diet  Diet Solids Recommendation: IDDSI 7 Easy to Chew  Liquid Consistency Recommendation: IDDSI 0 Thin  Recommended Form of Meds: Meds whole with TL or puree  · Encourage PO intake as able      Patient Diagnosis(es):   Patient Active Problem List    Diagnosis Date Noted    Cancer, meningeal carcinomatosis [facility]     Moderate protein-calorie malnutrition ([address] 11/10/2021    Encephalopathy     Urinary tract infection without hematuria     Diffuse large B-cell lymphoma of intra-abdominal lymph nodes ([facility]     Acute metabolic encephalopathy      Allergies: Allergies   Allergen Reactions    Codeine     Penicillins     Psyllium      Onset Date: Pt admitted to [facility] on 21    Subjective: RN ok'sarah entry of SLP. Pt alert and cooperative, agreeable to therapy. Pt's sister present at bedside. Pain:None reported    Current Diet: ADULT DIET; Easy to Chew  ADULT ORAL NUTRITION SUPPLEMENT; Breakfast, Lunch, Dinner; Clear Liquid Oral Supplement    Diet Tolerance:  Patient tolerating current diet level without signs/symptoms of penetration / aspiration. Dysphagia Treatment and Impressions:   Pt seen in room at bedside with RN permission   Chart Review/Interview: RN, pt, and pt's sister all report that pt is tolerating recommended diet. Pt's sister stated that pt continues to have a poor appetite, but ate more today than she has since being in the hospital.      Liquid PO Trials: Pt declined all PO trials despite encouragement from SLP and pt's sister, different options being provided      Solid PO Trials Pt declined all PO trials despite encouragement from SLP and pt's sister, different options being provided. SLP did assist pt in ordering dinner tray consisting of soup and jell-o.         Education: Session consisted of extensive education to pt and her sister re: the role of SLP, rationale of dysphagia f/u, importance of PO intake, safe swallow strategies (small bites/sips, slow rate, alternate liquids and solids, fully upright), diet recs. Pt verbalized understanding, needs reinforcement     Assessment: Pt tolerating current diet with no clinical s/s of aspiration. Fair carryover of recommended compensatory strategies, extensive education provided. Pt continues to preset with poor PO intake.  Recommendations: SLP recommends to continue with IDDSI 7 Regular- Easy To Chew; IDDSI 0 Thin Liquids - straws OK; Meds whole with thin liquids or puree. Encourage PO intake as able.  Risk Management: Eat/Feed slowly; Alternate solids and liquids; Upright as possible for all oral intake; Remain upright for 30-45 minutes after meals; Small bites/sips. If the patient exhibits s/s of aspiration and/or worsening respiratory status, hold PO and contact SLP. Dysphagia Goals:  Timeframe for Long-term Goals: 3 days (11/13/21)  Goal 1: The pt will tolerate safest and least restrictive diet without clinical s/s of aspiration or change in respiratory status. 11/11 - addressed and ongoing - pt declining all PO     Short-term Goals  Timeframe for Short-term Goals: 2 days (11/12/21)  Goal 1: The patient will tolerate recommended diet without observed clinical signs of aspiration  11/11 - addressed and ongoing - pt declining all PO     Goal 2: The patient/caregiver will demonstrate understanding of compensatory strategies for improved swallowing safety. 11/11 - addressed and ongoing - see above     Goal 3: The patient will tolerate regular consistency solids 10/10. \  11/11 - addressed and ongoing - pt declining all PO     Speech/Language/Cog Goals: N/A    Recommendations:  Solid Consistency: IDDSI 7 Regular- Easy To Chew  Liquid Consistency: IDDSI 0 Thin Liquids - straws OK  Medication: Meds whole with thin liquids or chin    Patient/Family/Caregiver Education: Session consisted of extensive education. See above. Compensatory Strategies: See above. Plan:    Continued Dysphagia treatment with goals per plan of care. Discharge Recommendations: Defer to PT/OT    If pt discharges from hospital prior to Speech/Swallowing discharge, this note serves as tx and discharge summary.      Total Treatment Time / Charges     Time in Time out Total Time / units   Cognitive Tx         Speech Tx      Dysphagia Tx 1605 1615 10 min / 1 unit     Signature:  Tamela Banks MA [address] [place] [address]  Speech Language Pathologist

## 2021-11-11 NOTE — CARE COORDINATION
Count includes the [facility]   Received referral regarding HC services from [facility]. Sent request to [facility] for [facility] coverage. Count includes the [facility] is able to service for SN, PT/OT. Liaison to fax referral to [facility] when orders received. Spoke with pt' sister, Michelle Longoria who is POA. Agreeable to [facility]. Verified demographics.     Electronically signed by Araceli Christianson RN on 11/11/2021 at 12:03 PM

## 2021-11-11 NOTE — CARE COORDINATION
Message left with admissions @ [facility] for status of referral. If able to accept, pt may be able to discharge today. Will follow. Addendum [id number]: Received phone call from Criselda Fox with [facility] who states that even though the Pt is not continuing chemo treatment at this time, they are unable to accept. This RN CM spoke with Pt's sister Sandra Kirkland who states that they decided to take the Pt home and would like to arrange home care services with [facility]. Referral made to [facility].

## 2021-11-11 NOTE — PLAN OF CARE
Problem: Falls - Risk of:  Goal: Will remain free from falls  Description: Will remain free from falls  Outcome: Ongoing  Goal: Absence of physical injury  Description: Absence of physical injury  Outcome: Ongoing     Problem: Skin Integrity:  Goal: Will show no infection signs and symptoms  Description: Will show no infection signs and symptoms  Outcome: Ongoing  Goal: Absence of new skin breakdown  Description: Absence of new skin breakdown  Outcome: Ongoing     Problem: Infection:  Goal: Will remain free from infection  Description: Will remain free from infection  Outcome: Ongoing     Problem: Safety:  Goal: Free from accidental physical injury  Description: Free from accidental physical injury  Outcome: Ongoing  Goal: Free from intentional harm  Description: Free from intentional harm  Outcome: Ongoing     Problem: Daily Care:  Goal: Daily care needs are met  Description: Daily care needs are met  Outcome: Ongoing     Problem: Pain:  Goal: Patient's pain/discomfort is manageable  Description: Patient's pain/discomfort is manageable  Outcome: Ongoing     Problem: Skin Integrity:  Goal: Skin integrity will stabilize  Description: Skin integrity will stabilize  Outcome: Ongoing     Problem: Discharge Planning:  Goal: Patients continuum of care needs are met  Description: Patients continuum of care needs are met  Outcome: Ongoing     Problem: Nutrition  Goal: Optimal nutrition therapy  11/10/2021 2044 by Khang Stevens RN  Outcome: Ongoing  11/10/2021 1846 by Sapna Ortiz RD, LD  Outcome: Ongoing

## 2021-11-11 NOTE — PROGRESS NOTES
Pt a/o. Am assessment completed see flow sheet. Patient with nausea with small amount of emesis at 0910, no further nausea/emesis at this time. Call light in reach .

## 2021-11-11 NOTE — PROGRESS NOTES
Patient resting quietly with eyes closed, no distress noted. No further complaints voiced. Call light in reach.

## 2021-11-12 VITALS
RESPIRATION RATE: 17 BRPM | TEMPERATURE: 97 F | WEIGHT: 142.2 LBS | BODY MASS INDEX: 26.17 KG/M2 | SYSTOLIC BLOOD PRESSURE: 128 MMHG | OXYGEN SATURATION: 95 % | DIASTOLIC BLOOD PRESSURE: 77 MMHG | HEIGHT: 62 IN | HEART RATE: 99 BPM

## 2021-11-12 LAB
ANION GAP SERPL CALCULATED.3IONS-SCNC: 12 MMOL/L (ref 3–16)
ANION GAP SERPL CALCULATED.3IONS-SCNC: 12 MMOL/L (ref 3–16)
BLOOD CULTURE, ROUTINE: NORMAL
BUN BLDV-MCNC: 4 MG/DL (ref 7–20)
BUN BLDV-MCNC: 4 MG/DL (ref 7–20)
CALCIUM SERPL-MCNC: 7.5 MG/DL (ref 8.3–10.6)
CALCIUM SERPL-MCNC: 7.7 MG/DL (ref 8.3–10.6)
CHLORIDE BLD-SCNC: 101 MMOL/L (ref 99–110)
CHLORIDE BLD-SCNC: 97 MMOL/L (ref 99–110)
CO2: 19 MMOL/L (ref 21–32)
CO2: 20 MMOL/L (ref 21–32)
CREAT SERPL-MCNC: <0.5 MG/DL (ref 0.6–1.2)
CREAT SERPL-MCNC: <0.5 MG/DL (ref 0.6–1.2)
CULTURE, BLOOD 2: ABNORMAL
CULTURE, BLOOD 2: ABNORMAL
GFR AFRICAN AMERICAN: >60
GFR AFRICAN AMERICAN: >60
GFR NON-AFRICAN AMERICAN: >60
GFR NON-AFRICAN AMERICAN: >60
GLUCOSE BLD-MCNC: 78 MG/DL (ref 70–99)
GLUCOSE BLD-MCNC: 83 MG/DL (ref 70–99)
MAGNESIUM: 1.5 MG/DL (ref 1.8–2.4)
ORGANISM: ABNORMAL
ORGANISM: ABNORMAL
POTASSIUM REFLEX MAGNESIUM: 2.9 MMOL/L (ref 3.5–5.1)
POTASSIUM REFLEX MAGNESIUM: 3.6 MMOL/L (ref 3.5–5.1)
SODIUM BLD-SCNC: 129 MMOL/L (ref 136–145)
SODIUM BLD-SCNC: 132 MMOL/L (ref 136–145)

## 2021-11-12 PROCEDURE — 6360000002 HC RX W HCPCS: Performed by: INTERNAL MEDICINE

## 2021-11-12 PROCEDURE — 94761 N-INVAS EAR/PLS OXIMETRY MLT: CPT

## 2021-11-12 PROCEDURE — 6370000000 HC RX 637 (ALT 250 FOR IP): Performed by: NURSE PRACTITIONER

## 2021-11-12 PROCEDURE — 6370000000 HC RX 637 (ALT 250 FOR IP): Performed by: INTERNAL MEDICINE

## 2021-11-12 PROCEDURE — 99239 HOSP IP/OBS DSCHRG MGMT >30: CPT | Performed by: INTERNAL MEDICINE

## 2021-11-12 PROCEDURE — 80048 BASIC METABOLIC PNL TOTAL CA: CPT

## 2021-11-12 PROCEDURE — 83735 ASSAY OF MAGNESIUM: CPT

## 2021-11-12 PROCEDURE — 2580000003 HC RX 258: Performed by: INTERNAL MEDICINE

## 2021-11-12 RX ORDER — MAGNESIUM SULFATE IN WATER 40 MG/ML
2000 INJECTION, SOLUTION INTRAVENOUS ONCE
Status: COMPLETED | OUTPATIENT
Start: 2021-11-12 | End: 2021-11-12

## 2021-11-12 RX ORDER — POTASSIUM CHLORIDE 20 MEQ/1
20 TABLET, EXTENDED RELEASE ORAL DAILY
Qty: 30 TABLET | Refills: 1 | Status: SHIPPED | OUTPATIENT
Start: 2021-11-13

## 2021-11-12 RX ORDER — POTASSIUM CHLORIDE 7.45 MG/ML
10 INJECTION INTRAVENOUS
Status: COMPLETED | OUTPATIENT
Start: 2021-11-12 | End: 2021-11-12

## 2021-11-12 RX ORDER — POTASSIUM CHLORIDE 750 MG/1
40 TABLET, EXTENDED RELEASE ORAL DAILY
Status: DISCONTINUED | OUTPATIENT
Start: 2021-11-12 | End: 2021-11-12 | Stop reason: HOSPADM

## 2021-11-12 RX ORDER — OXYCODONE HYDROCHLORIDE 5 MG/1
5 TABLET ORAL EVERY 4 HOURS PRN
Qty: 20 TABLET | Refills: 0 | Status: SHIPPED | OUTPATIENT
Start: 2021-11-12 | End: 2021-11-17

## 2021-11-12 RX ORDER — CIPROFLOXACIN 250 MG/1
250 TABLET, FILM COATED ORAL 2 TIMES DAILY
Qty: 14 TABLET | Refills: 0 | Status: SHIPPED | OUTPATIENT
Start: 2021-11-12 | End: 2021-11-19

## 2021-11-12 RX ADMIN — CEFTRIAXONE SODIUM 1000 MG: 1 INJECTION, POWDER, FOR SOLUTION INTRAMUSCULAR; INTRAVENOUS at 16:51

## 2021-11-12 RX ADMIN — POTASSIUM CHLORIDE 10 MEQ: 7.46 INJECTION, SOLUTION INTRAVENOUS at 12:48

## 2021-11-12 RX ADMIN — DOCUSATE SODIUM 100 MG: 100 CAPSULE ORAL at 07:01

## 2021-11-12 RX ADMIN — POTASSIUM CHLORIDE 10 MEQ: 7.46 INJECTION, SOLUTION INTRAVENOUS at 10:24

## 2021-11-12 RX ADMIN — OXYCODONE 5 MG: 5 TABLET ORAL at 16:52

## 2021-11-12 RX ADMIN — POTASSIUM CHLORIDE 10 MEQ: 7.46 INJECTION, SOLUTION INTRAVENOUS at 08:51

## 2021-11-12 RX ADMIN — MAGNESIUM SULFATE HEPTAHYDRATE 2000 MG: 40 INJECTION, SOLUTION INTRAVENOUS at 09:02

## 2021-11-12 RX ADMIN — POTASSIUM CHLORIDE 10 MEQ: 7.46 INJECTION, SOLUTION INTRAVENOUS at 11:30

## 2021-11-12 ASSESSMENT — PAIN SCALES - GENERAL
PAINLEVEL_OUTOF10: 0
PAINLEVEL_OUTOF10: 8

## 2021-11-12 NOTE — CARE COORDINATION
[facility]  Notified [facility] pt discharging home today. Spoke with pt's sister yesterday regarding HC. [facility]ax[facility]. [facility] Mon 11/15.       Electronically signed by Rafaela Card RN on 11/12/2021 at 1:38 PM

## 2021-11-12 NOTE — CARE COORDINATION
DISCHARGE ORDER  Date/Time 2021 2:30 PM  Completed by: Sharan Lopez RN, Case Management    Patient Name: Zay Awan      : 1950  Admitting Diagnosis: Encephalopathy [G93.40]  Urinary tract infection without hematuria, site unspecified [I41.0]  Acute metabolic encephalopathy [P65.04]      Admit order Date and Status: 21 inpt  (verify MD's last order for status of admission)      Noted discharge order. If applicable PT/OT recommendation at Discharge:  Home 24 hr assist and with home PT   DME recommendation by PT/OT: Needs met  Confirmed discharge plan  : Yes  with whom patient and sister  If pt confirmed DC plan does family need to be contacted by CM Yes if yes who sister   Discharge Plan:  Reviewed chart and noted pt was to dc  but was cancelled. Spoke with pt and sister at bedside and plan remains to return home with / care. Spoke with Amna Javier from [facility] re: dc cancelled for   And pt to dc today. Marleni Elaine will arrange for dc needs. Reviewed chart. Role of discharge planner explained and patient verbalized understanding. Discharge order is noted. Has Home O2 in place on admit:  No  Informed of need to bring portable home O2 tank on day of discharge for nursing to connect prior to leaving:   Not Indicated  Verbalized agreement/Understanding:   Not Indicated  Pt is being d/c'd to  home today. Pt's O2 sats are 95% on RA    Discharge timeout done with  Nsg, CM and pt. All discharge needs and concerns addressed.

## 2021-11-12 NOTE — DISCHARGE SUMMARY
Name:  Karie Eaton  Room:  0221/0221-02  MRN:    [id number]    Discharge Summary      This discharge summary is in conjunction with a complete physical exam done on the day of discharge. Attending Physician: Dr. Demetrio Romero  Discharging Physician: Dr. Cam Pulse: 11/8/2021  Discharge:   11/12/2021    HPI:    The patient is a 66 y.o. female with PMH below, presents with MS change/conmfusion, decreased PO intake, fall, generalized weakness. Pt has been having waxing and waning MS over the last month. This has been worsening over that time per her sister. She has had associated generalized weakness and decreased PO intake. Of note, she reportedly fell this am.  She denies any pain or injury. Verbal responses from pt are limited but she is able to speak clearly. She does not follow commands very well at this time. Lastly, she has hx of B cell lymphoma. Diagnoses this Admission and Hospital Course:       66year old female with COPD/asthma, hypertension, hyperlipidemia, hypothyroidism that presented to [facility] with altered mental status. 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 Admitted to [facility] 10/16-10/23. CT with intra-abdominal masses with obstructive uropathy. Retroperitoneal mass was biopsied. Pathology showed high grade B-cell lymphoma. Port placed. Chemo on 10/21. Seen by urology there. Held off on placement of nephrostomy tubes unless urine output declines or she develops renal failure. She was discharged to SNF. Patient now admitted with increasing confusion, UTI acute metabolic encephalopathy. Per sister at bedside. Confusion started prior to being diagnosed with B-Cell lymphoma. Undergoing chemotherapy. She has had one treatment. She was recently admitted to [facility]. She states she was being treated for UTI and confusion there. She was not discharged on any antibiotics per sister.   She went to SNF, then home with home care. Patient has had continued confusion. Pt treated for UTI as below . MRI brain done due to H/O ongoing confusion -> shows leptomeningeal carcinomatosis. I have updated patient's sister/POA. They will F/U with their oncologist       Sepsis, POA  - tachycardia, Leukocytosis due to UTI  - Leukocytosis improving. HR stable now  - Urine cultures and blood cultures growing E. coli  - treated with IVF's; IV Rocephin D#5  Sepsis resolved . DC on PO abx- cipro     Acute metabolic encephalopathy  - due to UTI. Work-up also suggesting brain metastasis now.  -Antibiotics as above,  monitored for improvement. Mental status is better today  trazadone discontinued     UTI, E. Coli  -Continued IV Rocephin D#5  - urine cx with E. Coli. Greater than 100,000, pansensitive  Blood cultures growing E. Coli  DC on PO abx      Poor PO intake  - IVF's. - dietician consult     Large B Cell Lymphoma  Possible brain metastasis  - recently diagnosed. Has POC. She is seeing oncologist at [facility], she has received 1 chemotherapy treatment.    -Was due for second chemo this week. -MRI of the brain and she has done this admission and shows leptomeningeal carcinomatosis  -Discussed with patient's sister - her POA . Plan is follow-up with oncologist , F/w Marcia  - on Oxy IR prn for abd pain     COPD/asthma  - stable. No AE. Continued Symbicort. Albuterol prn     Hypertension  - BP stable. Continued Toprol-XL, Amlodipine.      Hyperlipidemia  - on Pravastatin.      GERD  - on PPI. Hypokalemia   Hypomagnesemia   - repleted    Generalized weakness  - PT/OT consulted. plan is for DC home with [facility]         DVT Prophylaxis: Lovenox      Procedures (Please Review Full Report for Details)  N/A    Consults    N/A      Physical Exam at Discharge:    /77   Pulse 99   Temp 97 °F (36.1 °C) (Oral)   Resp 17   Ht 5' 2\" (1.575 m)   Wt 142 lb 3.2 oz (64.5 kg)   SpO2 95%   BMI 26.01 kg/m²     Gen: No distress. Alert. Awake.  Oriented to place and person  Eyes: PERRL. No sclera icterus. No conjunctival injection. ENT: No discharge. Pharynx clear. Neck:  Trachea midline. Right chest POC  Resp: No accessory muscle use. No crackles. No wheezes. No rhonchi. CV: Regular rate. Regular rhythm. No murmur. No rub. No edema. Capillary Refill: Brisk,< 3 seconds   Peripheral Pulses: +2 palpable, equal bilaterally   GI: Non-tender. Non-distended. Normal bowel sounds. No hernia. Skin: Warm and dry. No nodule on exposed extremities. No rash on exposed extremities. M/S: No cyanosis. No joint deformity. No clubbing. Neuro: Awake. Grossly nonfocal    Psych: Oriented to person, place. disoriented to time, situation. No anxiety or agitation. CBC: Recent Labs     11/11/21  0630   WBC 14.3*   HGB 9.9*   HCT 30.2*   MCV 82.4        BMP:   Recent Labs     11/11/21  0630 11/12/21  0719    129*   K 3.5 2.9*    97*   CO2 20* 20*   BUN 4* 4*   CREATININE <0.5* <0.5*      BMP  Repeated 11/12 1550   Ref. Range 11/12/2021 15:50   Sodium Latest Ref Range: 136 - 145 mmol/L 132 (L)   Potassium Latest Ref Range: 3.5 - 5.1 mmol/L 3.6   Chloride Latest Ref Range: 99 - 110 mmol/L 101   CO2 Latest Ref Range: 21 - 32 mmol/L 19 (L)   BUN Latest Ref Range: 7 - 20 mg/dL 4 (L)   Creatinine Latest Ref Range: 0.6 - 1.2 mg/dL <0.5 (L)   Anion Gap Latest Ref Range: 3 - 16  12   GFR Non- Latest Ref Range: >60  >60   GFR  Latest Ref Range: >60  >60   Glucose Latest Ref Range: 70 - 99 mg/dL 83   Calcium Latest Ref Range: 8.3 - 10.6 mg/dL 7.5 (L)     U/A:    Lab Results   Component Value Date    COLORU Yellow 11/08/2021    WBCUA >100 11/08/2021    RBCUA see below 11/08/2021    MUCUS 2+ 11/08/2021    BACTERIA 3+ 11/08/2021    CLARITYU SL CLOUDY 11/08/2021    SPECGRAV 1.015 11/08/2021    LEUKOCYTESUR LARGE 11/08/2021    BLOODU SMALL 11/08/2021    GLUCOSEU Negative 11/08/2021         CULTURES  Urine: E. Coli. COVID/Influenza: not detected  Blood: E. Coli     RADIOLOGY  MRI BRAIN W WO CONTRAST   Final Result   Findings worrisome for possible leptomeningeal carcinomatosis. The findings were sent to the Radiology Results [address] at 2:33   pm on 11/10/2021to be communicated to a licensed caregiver. CT Cervical Spine WO Contrast   Final Result   No acute abnormality of the cervical spine. CT Head WO Contrast   Final Result   No evidence of acute intracranial abnormality on a study limited by motion   artifact as described above. Discharge Medications     Medication List      START taking these medications    ciprofloxacin 250 MG tablet  Commonly known as: CIPRO  Take 1 tablet by mouth 2 times daily for 7 days     oxyCODONE 5 MG immediate release tablet  Commonly known as: ROXICODONE  Take 1 tablet by mouth every 4 hours as needed for Pain for up to 5 days.      potassium chloride 20 MEQ extended release tablet  Commonly known as: KLOR-CON M  Take 1 tablet by mouth daily  Start taking on: November 13, 2021        Radha First taking these medications    allopurinol 300 MG tablet  Commonly known as: ZYLOPRIM     amLODIPine 5 MG tablet  Commonly known as: NORVASC     aspirin 81 MG tablet     budesonide-formoterol 160-4.5 MCG/ACT Aero  Commonly known as: SYMBICORT     CALCIUM 500/VITAMIN D PO     docusate sodium 100 MG capsule  Commonly known as: COLACE     glucosamine-chondroitin 500-400 MG Caps     metoprolol succinate 100 MG extended release tablet  Commonly known as: TOPROL XL     Mometasone Furoate 50 MCG/ACT Aero     ondansetron 4 MG tablet  Commonly known as: ZOFRAN     pantoprazole 40 MG tablet  Commonly known as: PROTONIX     pravastatin 40 MG tablet  Commonly known as: PRAVACHOL     Q-10 CO-ENZYME PO     therapeutic multivitamin-minerals tablet     Ventolin  (90 Base) MCG/ACT inhaler  Generic drug: albuterol sulfate HFA        STOP taking these medications    traZODone 100 MG tablet  Commonly known as: DESYREL           Where to Get Your Medications      You can get these medications from any pharmacy    Bring a paper prescription for each of these medications  · ciprofloxacin 250 MG tablet  · oxyCODONE 5 MG immediate release tablet  · potassium chloride 20 MEQ extended release tablet           Discharged in stable condition to home. D/C home with [facility]. Total time 35 minutes. > 50%  dominated by counseling and coordination of care. Follow Up:   Follow up with PCP, oncology      Shaina Calderon MD   11/12/21

## 2021-11-12 NOTE — PROGRESS NOTES
11/11/21 2100   RT Protocol   History Pulmonary Disease 0   Respiratory pattern 0   Breath sounds 2   Cough 1   Indications for Bronchodilator Therapy None   Bronchodilator Assessment Score 3   RT Inhaler-Nebulizer Bronchodilator Protocol Note    There is a bronchodilator order in the chart from a provider indicating to follow the RT Bronchodilator Protocol and there is an Initiate RT Inhaler-Nebulizer Bronchodilator Protocol order as well (see protocol at bottom of note). CXR Findings:  No results found. The findings from the last RT Protocol Assessment were as follows:   History Pulmonary Disease: None or smoker <15 pack years  Respiratory Pattern: Regular pattern and RR 12-20 bpm  Breath Sounds: Slightly diminished and/or crackles  Cough: Strong, productive  Indication for Bronchodilator Therapy: None  Bronchodilator Assessment Score: 3    Aerosolized bronchodilator medication orders have been revised according to the RT Inhaler-Nebulizer Bronchodilator Protocol below. Respiratory Therapist to perform RT Therapy Protocol Assessment initially then follow the protocol. Repeat RT Therapy Protocol Assessment PRN for score 0-3 or on second treatment, BID, and PRN for scores above 3. No Indications - adjust the frequency to every 6 hours PRN wheezing or bronchospasm, if no treatments needed after 48 hours then discontinue using Per Protocol order mode. If indication present, adjust the RT bronchodilator orders based on the Bronchodilator Assessment Score as indicated below. Use Inhaler orders unless patient has one or more of the following: on home nebulizer, not able to hold breath for 10 seconds, is not alert and oriented, cannot activate and use MDI correctly, or respiratory rate 25 breaths per minute or more, then use the equivalent nebulizer order(s) with same Frequency and PRN reasons based on the score.   If a patient is on this medication at home then do not decrease Frequency below that used at home. 0-3 - enter or revise RT bronchodilator order(s) to equivalent RT Bronchodilator order with Frequency of every 4 hours PRN for wheezing or increased work of breathing using Per Protocol order mode. 4-6 - enter or revise RT Bronchodilator order(s) to two equivalent RT bronchodilator orders with one order with BID Frequency and one order with Frequency of every 4 hours PRN wheezing or increased work of breathing using Per Protocol order mode. 7-10 - enter or revise RT Bronchodilator order(s) to two equivalent RT bronchodilator orders with one order with TID Frequency and one order with Frequency of every 4 hours PRN wheezing or increased work of breathing using Per Protocol order mode. 11-13 - enter or revise RT Bronchodilator order(s) to one equivalent RT bronchodilator order with QID Frequency and an Albuterol order with Frequency of every 4 hours PRN wheezing or increased work of breathing using Per Protocol order mode. Greater than 13 - enter or revise RT Bronchodilator order(s) to one equivalent RT bronchodilator order with every 4 hours Frequency and an Albuterol order with Frequency of every 2 hours PRN wheezing or increased work of breathing using Per Protocol order mode.      Electronically signed by Pam Beal RCP on 11/11/2021 at 9:08 PM

## 2021-11-12 NOTE — PROGRESS NOTES
Prescriptions and discharge instructions given. Pt POA verbalized understanding/ denies questions/ needs at this time. Transported  to  family  vehicle for discharge home.

## 2021-11-12 NOTE — PROGRESS NOTES
New orders noted. Magnesium 2000 mg infused, and 4 doses of Potassium 10 meq IV, 3rd dose infusing at this time.   Patient alert but refusing PO meds due to  Nausea, and emesis this am.

## 2021-11-12 NOTE — PLAN OF CARE
Problem: Falls - Risk of:  Goal: Will remain free from falls  Description: Will remain free from falls  Outcome: Ongoing  Goal: Absence of physical injury  Description: Absence of physical injury  Outcome: Ongoing     Problem: Skin Integrity:  Goal: Will show no infection signs and symptoms  Description: Will show no infection signs and symptoms  Outcome: Ongoing  Goal: Absence of new skin breakdown  Description: Absence of new skin breakdown  Outcome: Ongoing     Problem: Infection:  Goal: Will remain free from infection  Description: Will remain free from infection  11/11/2021 2119 by Jose Ring RN  Outcome: Ongoing  11/11/2021 1320 by Lesly Medina RN  Outcome: Ongoing     Problem: Safety:  Goal: Free from accidental physical injury  Description: Free from accidental physical injury  11/11/2021 2119 by Jose Ring RN  Outcome: Ongoing  11/11/2021 1321 by Lesly Medina RN  Outcome: Ongoing  Goal: Free from intentional harm  Description: Free from intentional harm  Outcome: Ongoing     Problem: Daily Care:  Goal: Daily care needs are met  Description: Daily care needs are met  11/11/2021 2119 by Jose Ring RN  Outcome: Ongoing  11/11/2021 1320 by Lesly Medina RN  Outcome: Ongoing     Problem: Pain:  Goal: Patient's pain/discomfort is manageable  Description: Patient's pain/discomfort is manageable  Outcome: Ongoing  Goal: Pain level will decrease  Description: Pain level will decrease  Outcome: Ongoing  Goal: Control of acute pain  Description: Control of acute pain  Outcome: Ongoing  Goal: Control of chronic pain  Description: Control of chronic pain  Outcome: Ongoing     Problem: Skin Integrity:  Goal: Skin integrity will stabilize  Description: Skin integrity will stabilize  Outcome: Ongoing     Problem: Discharge Planning:  Goal: Patients continuum of care needs are met  Description: Patients continuum of care needs are met  Outcome: Ongoing     Problem: Nutrition  Goal: Optimal nutrition therapy  Outcome: Ongoing

## 2021-11-16 NOTE — CARE COORDINATION
11/16  Pt was a non admit with [facility]. Pt active with [facility]. Orders faxed to [facility]. for YEIMY.     Electronically signed by Sheila Christensen RN on 11/16/2021 at 3:11 PM

## 2023-04-20 NOTE — PROGRESS NOTES
Occupational Therapy Daily Treatment Note    Unit: [address] [facility]  Date:  11/10/2021  Patient Name:    Sam Iyer  Admitting diagnosis:  Encephalopathy [G93.40]  Urinary tract infection without hematuria, site unspecified [G62.9]  Acute metabolic encephalopathy [K05.27]  Admit Date:  11/8/2021  Precautions/Restrictions:       Fall risk, Bed/chair alarm, Lines -IV and Purewick catheter, Confusion and Telemetry       Discharge Recommendations: 24/7 assist with home OT   DME needs for discharge: continue to assess        Therapy recommendations for staff:   Assist of 1 with use of rolling walker (RW) for all transfers to/from [facility]  to/from chair    AM-PAC Score: AM-PAC Inpatient Daily Activity Raw Score: [address] Level: Level 1- Standard       Treatment Time:  9665-6571   Treatment number:  2    Timed code treatment minutes: 30minutes  Total treatment minutes:   30 minutes    History of Present Illness:   H&P  per [facility] 11-8-21  71 y. o. female  who presents to the ED complaining of confusion and decreased p.o. intake, per her sister who is at the bedside.  She states the patient has been having waxing and waning mental status for the last month, with decreased oral intake.  She does have large B-cell lymphoma and has been undergoing chemotherapy. Anna Hartman deny any recent fevers, chills, chest pain, shortness of breath, abdominal pain.  During my evaluation the patient is able to verbalize responses to me, however she does not consistently follow commands.  She is currently denying any pain.    sister reported that the patient had a fall at home on Monday morning when she was ambulating from her bed to her bedside commode  Subjective:  PT in bed and willing to work with OT    Pain   Yes  Rating: mild  Location:back   Pain Medicine Status: no request       Bed Mobility:   Supine to Sit:  Supervision and SBA with VC  Sit to Supine:  Supervision  Rolling:           Not Tested  Scooting:        Supervision with VC The ECG shows a sinus rhythm   ECG rate  = 68 bpm  Transfer Training:   Sit to stand:   CGA  Stand to sit:  CGA  Bed to Chair:  Not Tested  Bed to [facility]:   Not Tested  Standard toilet:   Not Tested    Activity Tolerance   Pt completed therapy session with No adverse symptoms noted w/activity    ADL Training:   Upper body dressing: Min A - due to lines   Upper body bathing:  Supervision to ensure bathing of all areas   Lower body dressing:  Not Tested- pt declined donning pants and states she does not wear pants  Lower body bathing:  SBA  Toileting:   Not Tested  Grooming/Hygiene:  Supervision- pt needed VC to ensure brushing all areas of hair     Therapeutic Exercise:   N/A    Patient Education:   Role of OT    Positioning Needs:   Pt in bed, alarm set, positioned in proper neutral alignment and pressure relief provided. Family Present:  No    Assessment:   Pt was sup to SBA for supine to sit and CGA for transfer. The pt was Min assist for UB dressing due to lines. The pt declined LB dressing. The pt needs VC to complete ADL tasks completely. The pt would benefit from continued OT. GOALS  To be met in 3 Visits:  1). Bed to toilet/BSC: SBA with AD as needed      To be met in 5 Visits:  1). Supine to/from Sit:             Independent  2). Upper Body Bathing:         Supervision  3). Lower Body Bathing:         CGA  4). Upper Body Dressing:       Supervision  5). Lower Body Dressing:       Min A   6).  Pt to demonstrate UE exs x 15 reps with minimal cues         Plan: cont with POC    Chastity Morales OTR/L [id number]      If patient discharges from this facility prior to next visit, this note will serve as the Discharge Summary